# Patient Record
Sex: MALE | Race: WHITE | NOT HISPANIC OR LATINO | Employment: OTHER | URBAN - NONMETROPOLITAN AREA
[De-identification: names, ages, dates, MRNs, and addresses within clinical notes are randomized per-mention and may not be internally consistent; named-entity substitution may affect disease eponyms.]

---

## 2023-05-01 DIAGNOSIS — I10 PRIMARY HYPERTENSION: Primary | ICD-10-CM

## 2023-05-01 RX ORDER — LISINOPRIL 5 MG/1
1 TABLET ORAL DAILY
COMMUNITY
Start: 2014-07-28 | End: 2023-05-01 | Stop reason: SDUPTHER

## 2023-05-01 RX ORDER — LISINOPRIL 5 MG/1
5 TABLET ORAL DAILY
Qty: 90 TABLET | Refills: 0 | Status: SHIPPED | OUTPATIENT
Start: 2023-05-01 | End: 2023-05-08 | Stop reason: ALTCHOICE

## 2023-05-08 DIAGNOSIS — I10 PRIMARY HYPERTENSION: Primary | ICD-10-CM

## 2023-05-08 RX ORDER — LISINOPRIL 10 MG/1
10 TABLET ORAL DAILY
COMMUNITY
Start: 2023-02-28 | End: 2023-05-08 | Stop reason: SDUPTHER

## 2023-05-08 RX ORDER — LISINOPRIL 10 MG/1
10 TABLET ORAL DAILY
Qty: 90 TABLET | Refills: 1 | Status: SHIPPED | OUTPATIENT
Start: 2023-05-08 | End: 2023-06-13 | Stop reason: ALTCHOICE

## 2023-05-30 PROBLEM — N32.89 BLADDER MASS: Status: ACTIVE | Noted: 2023-05-30

## 2023-05-30 PROBLEM — N52.9 ERECTILE DYSFUNCTION: Status: ACTIVE | Noted: 2023-05-30

## 2023-05-30 PROBLEM — I10 BENIGN HYPERTENSION: Status: ACTIVE | Noted: 2023-05-30

## 2023-05-30 PROBLEM — E78.00 PURE HYPERCHOLESTEROLEMIA: Status: ACTIVE | Noted: 2023-05-30

## 2023-05-30 PROBLEM — G47.30 SLEEP APNEA: Status: ACTIVE | Noted: 2023-05-30

## 2023-05-30 PROBLEM — N40.0 BPH WITH ELEVATED PSA: Status: ACTIVE | Noted: 2023-05-30

## 2023-05-30 PROBLEM — K21.9 GERD (GASTROESOPHAGEAL REFLUX DISEASE): Status: ACTIVE | Noted: 2023-05-30

## 2023-05-30 PROBLEM — R97.20 BPH WITH ELEVATED PSA: Status: ACTIVE | Noted: 2023-05-30

## 2023-05-30 NOTE — PROGRESS NOTES
Subjective   Patient ID:   Van Latham is a 67 y.o. male who presents for No chief complaint on file..  HPI  HTN:  Taking Lisinopril - I increased this last visit.  BP today is  Denies dizziness, headaches.    Bladder mass/BPH:  Had an elevated PSA in Nov 2021.  Seeing urology.  He underwent bladder surgery in March 2022.  Had more procedures in Aug 2022.  Had prostate MRI in Jan 2022.    HLD:  Taking Crestor and Fenofibrate.  Last checked Dec 2022.    Left heel pain:  Off and on.  Not overly bothersome.    GERD:  Taking Prilosec every other day.  No acid reflux concerns.    ED:  Taking Viagra as needed.    MIREYA:  On Bipap.    Health maintenance:  Smoking: Never a smoker.  PSA (50+): Dec 2022  Labs: Dec 2022  Colonoscopy (50-75): Jan 2018.  Influenza:   Prevnar 13 (65+): 2013  Pneumovax 23 (65+, 1 year after Prev 13): 2022  Influenza:  Zostavax (60+, 1 time, at pharmacy): 2020, 2021     Review of Systems  12 point review of systems negative unless stated above in HPI    There were no vitals filed for this visit.    Physical Exam  General: Alert and oriented, well nourished, no acute distress.  Lungs: Clear to auscultation, non-labored respiration.  Heart: Normal rate, regular rhythm, no murmur, gallop or edema.  Neurologic: Awake, alert, and oriented X3, CN II-XII intact.  Psychiatric: Cooperative, appropriate mood and affect.    Assessment/Plan   Diagnoses and all orders for this visit:  Medicare annual wellness visit, subsequent  Depression screening  Benign hypertension  Bladder mass  BPH with elevated PSA  Erectile dysfunction, unspecified erectile dysfunction type  Gastroesophageal reflux disease without esophagitis  Pure hypercholesterolemia  Sleep apnea, unspecified type

## 2023-06-13 ENCOUNTER — OFFICE VISIT (OUTPATIENT)
Dept: PRIMARY CARE | Facility: CLINIC | Age: 68
End: 2023-06-13
Payer: MEDICARE

## 2023-06-13 VITALS
RESPIRATION RATE: 18 BRPM | DIASTOLIC BLOOD PRESSURE: 90 MMHG | SYSTOLIC BLOOD PRESSURE: 146 MMHG | BODY MASS INDEX: 32.64 KG/M2 | HEART RATE: 61 BPM | OXYGEN SATURATION: 94 % | WEIGHT: 228 LBS | HEIGHT: 70 IN

## 2023-06-13 DIAGNOSIS — N40.0 BPH WITH ELEVATED PSA: ICD-10-CM

## 2023-06-13 DIAGNOSIS — I10 BENIGN HYPERTENSION: Primary | ICD-10-CM

## 2023-06-13 DIAGNOSIS — N32.89 BLADDER MASS: ICD-10-CM

## 2023-06-13 DIAGNOSIS — E78.00 PURE HYPERCHOLESTEROLEMIA: ICD-10-CM

## 2023-06-13 DIAGNOSIS — R97.20 BPH WITH ELEVATED PSA: ICD-10-CM

## 2023-06-13 DIAGNOSIS — N52.9 ERECTILE DYSFUNCTION, UNSPECIFIED ERECTILE DYSFUNCTION TYPE: ICD-10-CM

## 2023-06-13 DIAGNOSIS — I10 PRIMARY HYPERTENSION: ICD-10-CM

## 2023-06-13 DIAGNOSIS — G47.30 SLEEP APNEA, UNSPECIFIED TYPE: ICD-10-CM

## 2023-06-13 DIAGNOSIS — K21.9 GASTROESOPHAGEAL REFLUX DISEASE WITHOUT ESOPHAGITIS: ICD-10-CM

## 2023-06-13 PROCEDURE — 3077F SYST BP >= 140 MM HG: CPT | Performed by: PHYSICIAN ASSISTANT

## 2023-06-13 PROCEDURE — 99213 OFFICE O/P EST LOW 20 MIN: CPT | Performed by: PHYSICIAN ASSISTANT

## 2023-06-13 PROCEDURE — 3080F DIAST BP >= 90 MM HG: CPT | Performed by: PHYSICIAN ASSISTANT

## 2023-06-13 PROCEDURE — 1036F TOBACCO NON-USER: CPT | Performed by: PHYSICIAN ASSISTANT

## 2023-06-13 PROCEDURE — 1159F MED LIST DOCD IN RCRD: CPT | Performed by: PHYSICIAN ASSISTANT

## 2023-06-13 PROCEDURE — 1160F RVW MEDS BY RX/DR IN RCRD: CPT | Performed by: PHYSICIAN ASSISTANT

## 2023-06-13 RX ORDER — LISINOPRIL 20 MG/1
20 TABLET ORAL DAILY
Qty: 90 TABLET | Refills: 1 | Status: SHIPPED | OUTPATIENT
Start: 2023-06-13 | End: 2023-11-15

## 2023-06-13 RX ORDER — SILDENAFIL 100 MG/1
100 TABLET, FILM COATED ORAL AS NEEDED
COMMUNITY
Start: 2018-02-05 | End: 2023-07-03 | Stop reason: SDUPTHER

## 2023-06-13 RX ORDER — OMEPRAZOLE 20 MG/1
1 CAPSULE, DELAYED RELEASE ORAL DAILY
COMMUNITY
Start: 2014-07-28 | End: 2023-06-29 | Stop reason: SDUPTHER

## 2023-06-13 RX ORDER — FENOFIBRATE 145 MG/1
145 TABLET, FILM COATED ORAL DAILY
COMMUNITY
End: 2023-07-03 | Stop reason: SDUPTHER

## 2023-06-13 RX ORDER — ROSUVASTATIN CALCIUM 10 MG/1
1 TABLET, COATED ORAL DAILY
COMMUNITY
Start: 2014-08-29 | End: 2023-06-29 | Stop reason: SDUPTHER

## 2023-06-13 ASSESSMENT — PATIENT HEALTH QUESTIONNAIRE - PHQ9
SUM OF ALL RESPONSES TO PHQ9 QUESTIONS 1 AND 2: 0
2. FEELING DOWN, DEPRESSED OR HOPELESS: NOT AT ALL
1. LITTLE INTEREST OR PLEASURE IN DOING THINGS: NOT AT ALL

## 2023-06-13 ASSESSMENT — PAIN SCALES - GENERAL: PAINLEVEL: 2

## 2023-06-13 NOTE — PROGRESS NOTES
Subjective   Patient ID:   Van Latham is a 67 y.o. male who presents for Follow-up (6 month f/u).  HPI  HTN:  Taking Lisinopril - I increased this last visit.  BP today is 146/90.  Runs in the 130s at home.  Denies dizziness, headaches.    Bladder mass/BPH:  Had an elevated PSA in Nov 2021.  Seeing urology.  He underwent bladder surgery in March 2022.  Had more procedures in Aug 2022.  Had prostate MRI in Jan 2022.    HLD:  Taking Crestor and Fenofibrate.  Last checked Dec 2022.    Left heel pain:  Off and on.  Not overly bothersome.    GERD:  Taking Prilosec every other day.  No acid reflux concerns.    ED:  Taking Viagra as needed.    MIREYA:  On Bipap.    Health maintenance:  Smoking: Never a smoker.  PSA (50+): Dec 2022  Labs: Dec 2022  Colonoscopy (50-75): Jan 2018.  Influenza:   Prevnar 13 (65+): 2013  Pneumovax 23 (65+, 1 year after Prev 13): 2022  Influenza:  Zostavax (60+, 1 time, at pharmacy): 2020, 2021     Review of Systems  12 point review of systems negative unless stated above in HPI    Vitals:    06/13/23 0742   BP: 146/90   Pulse: 61   Resp: 18   SpO2: 94%       Physical Exam  General: Alert and oriented, well nourished, no acute distress.  Lungs: Clear to auscultation, non-labored respiration.  Heart: Normal rate, regular rhythm, no murmur, gallop or edema.  Neurologic: Awake, alert, and oriented X3, CN II-XII intact.  Psychiatric: Cooperative, appropriate mood and affect.    Assessment/Plan   BP is still a bit up today.  I have increased the Lisinopril to 20mg.  Continue checking at home and call if getting over 140/90.  I have also ordered a cardiac CT to be done.  Continue the same medications.  Chronic conditions are stable.  Call with questions or concerns.    F/u  6 months  Diagnoses and all orders for this visit:  Benign hypertension  -     CT cardiac scoring wo IV contrast; Future  Bladder mass  BPH with elevated PSA  Erectile dysfunction, unspecified erectile dysfunction  type  Gastroesophageal reflux disease without esophagitis  Pure hypercholesterolemia  -     CT cardiac scoring wo IV contrast; Future  Sleep apnea, unspecified type  Primary hypertension  -     lisinopril 20 mg tablet; Take 1 tablet (20 mg) by mouth once daily.

## 2023-06-28 NOTE — RESULT ENCOUNTER NOTE
Calcium score shows a 1.3% annual cardiac risk which is in the middle range. If we get his BP under control, I think he will be in good shape. Diet and exercise

## 2023-06-29 DIAGNOSIS — E78.00 PURE HYPERCHOLESTEROLEMIA: ICD-10-CM

## 2023-06-29 DIAGNOSIS — K21.9 GASTROESOPHAGEAL REFLUX DISEASE WITHOUT ESOPHAGITIS: ICD-10-CM

## 2023-06-29 RX ORDER — OMEPRAZOLE 20 MG/1
20 CAPSULE, DELAYED RELEASE ORAL DAILY
Qty: 90 CAPSULE | Refills: 0 | Status: SHIPPED | OUTPATIENT
Start: 2023-06-29 | End: 2023-09-18

## 2023-06-29 RX ORDER — ROSUVASTATIN CALCIUM 10 MG/1
10 TABLET, COATED ORAL DAILY
Qty: 90 TABLET | Refills: 0 | Status: SHIPPED | OUTPATIENT
Start: 2023-06-29 | End: 2023-09-18

## 2023-07-03 DIAGNOSIS — E78.00 PURE HYPERCHOLESTEROLEMIA: ICD-10-CM

## 2023-07-03 DIAGNOSIS — N52.9 ERECTILE DYSFUNCTION, UNSPECIFIED ERECTILE DYSFUNCTION TYPE: ICD-10-CM

## 2023-07-03 RX ORDER — FENOFIBRATE 145 MG/1
145 TABLET, FILM COATED ORAL DAILY
Qty: 90 TABLET | Refills: 0 | Status: SHIPPED | OUTPATIENT
Start: 2023-07-03 | End: 2023-10-02

## 2023-07-03 RX ORDER — SILDENAFIL 100 MG/1
100 TABLET, FILM COATED ORAL AS NEEDED
Qty: 10 TABLET | Refills: 2 | Status: SHIPPED | OUTPATIENT
Start: 2023-07-03 | End: 2024-04-12 | Stop reason: ALTCHOICE

## 2023-09-16 DIAGNOSIS — E78.00 PURE HYPERCHOLESTEROLEMIA: ICD-10-CM

## 2023-09-16 DIAGNOSIS — K21.9 GASTROESOPHAGEAL REFLUX DISEASE WITHOUT ESOPHAGITIS: ICD-10-CM

## 2023-09-18 RX ORDER — OMEPRAZOLE 20 MG/1
20 CAPSULE, DELAYED RELEASE ORAL DAILY
Qty: 90 CAPSULE | Refills: 0 | Status: SHIPPED | OUTPATIENT
Start: 2023-09-18 | End: 2023-12-15

## 2023-09-18 RX ORDER — ROSUVASTATIN CALCIUM 10 MG/1
10 TABLET, COATED ORAL DAILY
Qty: 90 TABLET | Refills: 0 | Status: SHIPPED | OUTPATIENT
Start: 2023-09-18 | End: 2023-12-15

## 2023-10-01 DIAGNOSIS — E78.00 PURE HYPERCHOLESTEROLEMIA: ICD-10-CM

## 2023-10-02 RX ORDER — FENOFIBRATE 145 MG/1
145 TABLET, FILM COATED ORAL DAILY
Qty: 90 TABLET | Refills: 0 | Status: SHIPPED | OUTPATIENT
Start: 2023-10-02 | End: 2024-01-08

## 2023-12-11 ENCOUNTER — APPOINTMENT (OUTPATIENT)
Dept: PRIMARY CARE | Facility: CLINIC | Age: 68
End: 2023-12-11
Payer: MEDICARE

## 2023-12-12 ENCOUNTER — OFFICE VISIT (OUTPATIENT)
Dept: PRIMARY CARE | Facility: CLINIC | Age: 68
End: 2023-12-12
Payer: MEDICARE

## 2023-12-12 VITALS
BODY MASS INDEX: 32.47 KG/M2 | OXYGEN SATURATION: 96 % | DIASTOLIC BLOOD PRESSURE: 82 MMHG | SYSTOLIC BLOOD PRESSURE: 132 MMHG | WEIGHT: 226.8 LBS | HEIGHT: 70 IN | HEART RATE: 63 BPM

## 2023-12-12 DIAGNOSIS — R79.9 ABNORMAL FINDING OF BLOOD CHEMISTRY, UNSPECIFIED: ICD-10-CM

## 2023-12-12 DIAGNOSIS — Z00.00 ENCOUNTER FOR ANNUAL WELLNESS EXAM IN MEDICARE PATIENT: ICD-10-CM

## 2023-12-12 DIAGNOSIS — R01.1 MURMUR, CARDIAC: ICD-10-CM

## 2023-12-12 DIAGNOSIS — E78.00 PURE HYPERCHOLESTEROLEMIA: ICD-10-CM

## 2023-12-12 DIAGNOSIS — I10 BENIGN HYPERTENSION: ICD-10-CM

## 2023-12-12 DIAGNOSIS — R97.20 ELEVATED PSA: ICD-10-CM

## 2023-12-12 PROBLEM — M79.672 PAIN OF LEFT HEEL: Status: RESOLVED | Noted: 2023-12-12 | Resolved: 2023-12-12

## 2023-12-12 PROBLEM — N39.0 ACUTE LOWER UTI: Status: RESOLVED | Noted: 2023-12-12 | Resolved: 2023-12-12

## 2023-12-12 PROBLEM — R53.83 FATIGUE: Status: RESOLVED | Noted: 2023-12-12 | Resolved: 2023-12-12

## 2023-12-12 PROBLEM — J20.8 ACUTE BRONCHITIS DUE TO OTHER SPECIFIED ORGANISMS: Status: RESOLVED | Noted: 2023-12-12 | Resolved: 2023-12-12

## 2023-12-12 PROBLEM — J04.0 ACUTE LARYNGITIS: Status: RESOLVED | Noted: 2023-12-12 | Resolved: 2023-12-12

## 2023-12-12 PROBLEM — E83.52 HIGH CALCIUM LEVELS: Status: ACTIVE | Noted: 2023-12-12

## 2023-12-12 PROBLEM — R06.83 SNORING: Status: RESOLVED | Noted: 2023-12-12 | Resolved: 2023-12-12

## 2023-12-12 PROBLEM — J20.9 ACUTE BRONCHITIS: Status: RESOLVED | Noted: 2023-12-12 | Resolved: 2023-12-12

## 2023-12-12 PROBLEM — J01.00 ACUTE MAXILLARY SINUSITIS: Status: RESOLVED | Noted: 2023-12-12 | Resolved: 2023-12-12

## 2023-12-12 PROBLEM — S83.241A TEAR OF MEDIAL MENISCUS OF RIGHT KNEE, CURRENT: Status: ACTIVE | Noted: 2023-12-12

## 2023-12-12 PROBLEM — E66.811 OBESITY, CLASS I, BMI 30-34.9: Status: ACTIVE | Noted: 2023-12-12

## 2023-12-12 PROBLEM — R40.0 DAYTIME SOMNOLENCE: Status: RESOLVED | Noted: 2023-12-12 | Resolved: 2023-12-12

## 2023-12-12 PROBLEM — R73.9 HYPERGLYCEMIA: Status: ACTIVE | Noted: 2023-12-12

## 2023-12-12 PROBLEM — J02.9 ACUTE PHARYNGITIS: Status: RESOLVED | Noted: 2023-12-12 | Resolved: 2023-12-12

## 2023-12-12 PROBLEM — R07.89 CHEST PRESSURE: Status: RESOLVED | Noted: 2023-12-12 | Resolved: 2023-12-12

## 2023-12-12 PROBLEM — E66.9 OBESITY, CLASS I, BMI 30-34.9: Status: ACTIVE | Noted: 2023-12-12

## 2023-12-12 PROBLEM — R19.5 OCCULT BLOOD IN STOOLS: Status: RESOLVED | Noted: 2023-12-12 | Resolved: 2023-12-12

## 2023-12-12 PROBLEM — M25.512 LEFT SHOULDER PAIN: Status: RESOLVED | Noted: 2023-12-12 | Resolved: 2023-12-12

## 2023-12-12 PROCEDURE — G0439 PPPS, SUBSEQ VISIT: HCPCS | Performed by: INTERNAL MEDICINE

## 2023-12-12 PROCEDURE — 3075F SYST BP GE 130 - 139MM HG: CPT | Performed by: INTERNAL MEDICINE

## 2023-12-12 PROCEDURE — 3079F DIAST BP 80-89 MM HG: CPT | Performed by: INTERNAL MEDICINE

## 2023-12-12 PROCEDURE — 1160F RVW MEDS BY RX/DR IN RCRD: CPT | Performed by: INTERNAL MEDICINE

## 2023-12-12 PROCEDURE — 1125F AMNT PAIN NOTED PAIN PRSNT: CPT | Performed by: INTERNAL MEDICINE

## 2023-12-12 PROCEDURE — 3008F BODY MASS INDEX DOCD: CPT | Performed by: INTERNAL MEDICINE

## 2023-12-12 PROCEDURE — 1159F MED LIST DOCD IN RCRD: CPT | Performed by: INTERNAL MEDICINE

## 2023-12-12 PROCEDURE — 1036F TOBACCO NON-USER: CPT | Performed by: INTERNAL MEDICINE

## 2023-12-12 PROCEDURE — 99214 OFFICE O/P EST MOD 30 MIN: CPT | Performed by: INTERNAL MEDICINE

## 2023-12-12 PROCEDURE — 1170F FXNL STATUS ASSESSED: CPT | Performed by: INTERNAL MEDICINE

## 2023-12-12 RX ORDER — TADALAFIL 5 MG/1
5 TABLET ORAL DAILY
COMMUNITY
End: 2024-01-24 | Stop reason: SDUPTHER

## 2023-12-12 RX ORDER — TADALAFIL 10 MG/1
TABLET ORAL
COMMUNITY
Start: 2023-10-09 | End: 2024-01-24 | Stop reason: SDUPTHER

## 2023-12-12 ASSESSMENT — ACTIVITIES OF DAILY LIVING (ADL)
DOING_HOUSEWORK: INDEPENDENT
DRESSING: INDEPENDENT
MANAGING_FINANCES: INDEPENDENT
GROCERY_SHOPPING: INDEPENDENT
TAKING_MEDICATION: INDEPENDENT
BATHING: INDEPENDENT

## 2023-12-12 ASSESSMENT — PATIENT HEALTH QUESTIONNAIRE - PHQ9
1. LITTLE INTEREST OR PLEASURE IN DOING THINGS: NOT AT ALL
SUM OF ALL RESPONSES TO PHQ9 QUESTIONS 1 AND 2: 0
2. FEELING DOWN, DEPRESSED OR HOPELESS: NOT AT ALL

## 2023-12-12 NOTE — PROGRESS NOTES
Patient ID:   Van Latham is a 68 y.o. male with PMH remarkable for HTN, BPH s/p robotic simple prostatectomy (benign pathology), GERD, ED, MIREYA on bipap, who presents to the office today for Annual Exam and Medicare Annual Wellness Visit Subsequent.    HEALTH MAINTENANCE: Annual Wellness Physical   Smoking: Never a Smoker  Labs: 2022 -> DUE  PSA: DUE  Colonoscopy (45-75): 1/15/2018 with Dr Gee, normal  CT Cardiac Scoring 2023 showed calcium score of 180.9  - lost weight since last year. 240# 2022 --> today 226#  - brother recently diagnosed with valve issue, other brother dx with high CT calcium scoring and is undergoing angiogram  - father  at age 56 of cardiac arrest  - FU in 4m or sooner if needed    SOCIAL HISTORY:  Social History     Tobacco Use    Smoking status: Never    Smokeless tobacco: Never   Substance Use Topics    Alcohol use: Not Currently     Comment: rare    Drug use: Never     IMMUNIZATIONS:  Immunization History   Administered Date(s) Administered    Moderna SARS-CoV-2 Vaccination 2021, 2021, 2021, 2022     REVIEW OF SYSTEMS:  Review of Systems   All other systems reviewed and are negative.    ALLERGIES:  No Known Allergies     VITAL SIGNS:  Vitals:    23 1116   BP: 132/82   Pulse: 63   SpO2: 96%       Physical Exam  Vitals reviewed.   Constitutional:       General: He is not in acute distress.     Appearance: Normal appearance. He is not ill-appearing.   HENT:      Head: Normocephalic and atraumatic.      Right Ear: Tympanic membrane and external ear normal.      Left Ear: Tympanic membrane and external ear normal.      Nose: Nose normal.      Mouth/Throat:      Mouth: Mucous membranes are moist.      Pharynx: Oropharynx is clear.   Eyes:      Conjunctiva/sclera: Conjunctivae normal.      Pupils: Pupils are equal, round, and reactive to light.   Cardiovascular:      Rate and Rhythm: Normal rate and regular rhythm.      Heart sounds: Murmur heard.    Pulmonary:      Effort: Pulmonary effort is normal. No respiratory distress.      Breath sounds: Normal breath sounds. No wheezing.   Abdominal:      General: There is no distension.      Palpations: Abdomen is soft. There is no mass.      Tenderness: There is no abdominal tenderness.   Musculoskeletal:         General: Normal range of motion.      Cervical back: Normal range of motion and neck supple.   Skin:     General: Skin is warm and dry.   Neurological:      General: No focal deficit present.      Mental Status: He is alert and oriented to person, place, and time.      Sensory: No sensory deficit.      Motor: No weakness.      Coordination: Coordination normal.      Gait: Gait normal.   Psychiatric:         Mood and Affect: Mood normal.         Behavior: Behavior normal.       MEDICATIONS:  Current Outpatient Medications on File Prior to Visit   Medication Sig Dispense Refill    fenofibrate (Tricor) 145 mg tablet take 1 tablet by mouth once daily 90 tablet 0    lisinopril 20 mg tablet TAKE 1 TABLET ONCE DAILY 90 tablet 1    omeprazole (PriLOSEC) 20 mg DR capsule TAKE 1 CAPSULE DAILY 90 capsule 0    rosuvastatin (Crestor) 10 mg tablet TAKE 1 TABLET ONCE DAILY 90 tablet 0    sildenafil (Viagra) 100 mg tablet Take 1 tablet (100 mg) by mouth if needed for erectile dysfunction. 10 tablet 2    tadalafil (Cialis) 10 mg tablet take 1 to 2 tablets by mouth 1 to 2 hours PRIOR TO SEXUAL ACTIVITY      tadalafil (Cialis) 5 mg tablet Take 1 tablet (5 mg) by mouth once daily.       No current facility-administered medications on file prior to visit.      LABORATORY DATA:  Lab Results   Component Value Date    WBC 6.9 12/13/2022    HGB 12.0 (L) 12/13/2022    HCT 40.2 (L) 12/13/2022     12/13/2022    CHOL 135 12/13/2022    TRIG 123 12/13/2022    HDL 30.0 (A) 12/13/2022    ALT 28 12/13/2022    AST 34 12/13/2022     12/13/2022    K 4.1 12/13/2022     12/13/2022    CREATININE 1.20 12/13/2022    BUN 22  12/13/2022    CO2 26 12/13/2022    TSH 2.38 11/23/2019    PSA 5.6 (H) 12/20/2021    INR 1.1 03/07/2022    HGBA1C 5.8 (A) 11/23/2021     ASSESSMENT AND PLAN:  Assessment/Plan   Diagnoses and all orders for this visit:  Encounter for annual wellness exam in Medicare patient  -     CBC and Auto Differential; Future  -     Comprehensive Metabolic Panel; Future  -     Lipid Panel; Future  -     TSH with reflex to Free T4 if abnormal; Future  -     Vitamin D 25-Hydroxy,Total (for eval of Vitamin D levels); Future  -     Vitamin B12; Future  Elevated PSA  -     Prostate Specific Antigen, Screen; Future  Benign hypertension  -     TSH with reflex to Free T4 if abnormal; Future  -     Vitamin D 25-Hydroxy,Total (for eval of Vitamin D levels); Future  -     Vitamin B12; Future  Pure hypercholesterolemia  -     Lipid Panel; Future  Murmur, cardiac  -     Transthoracic Echo (TTE) Complete; Future  Abnormal finding of blood chemistry, unspecified  -     CBC and Auto Differential; Future  Body mass index (BMI) 32.0-32.9, adult  -     Vitamin D 25-Hydroxy,Total (for eval of Vitamin D levels); Future      --------------------  Written by Rosa Morataya RN, acting as a scribe for Dr. Mckinnon. This note accurately reflects the work and decisions made by Dr. Mckinnon.     I, Dr. Mckinnon, attest all medical record entries made by the scribe were under my direction and were personally dictated by me. I have reviewed the chart and agree that the record accurately reflects my performance of the history, physical exam, and assessment and plan.

## 2023-12-12 NOTE — PATIENT INSTRUCTIONS
It was nice to see you today!    Please have your blood drawn in the next 1-2 weeks.   You need to be FASTING for 12 hours prior to blood draw.  We will contact you with the results of your blood work and any necessary adjustments to your plan of care.  If you do not hear from us within 3-5 days of having your blood drawn, please call the [place] office at [phone].   Please contact the office if there are any questions regarding your care or treatment.     [facility] [phone].

## 2023-12-13 ENCOUNTER — LAB (OUTPATIENT)
Dept: LAB | Facility: LAB | Age: 68
End: 2023-12-13
Payer: MEDICARE

## 2023-12-13 DIAGNOSIS — I10 BENIGN HYPERTENSION: ICD-10-CM

## 2023-12-13 DIAGNOSIS — R79.9 ABNORMAL FINDING OF BLOOD CHEMISTRY, UNSPECIFIED: ICD-10-CM

## 2023-12-13 DIAGNOSIS — E78.00 PURE HYPERCHOLESTEROLEMIA: ICD-10-CM

## 2023-12-13 DIAGNOSIS — R97.20 ELEVATED PSA: ICD-10-CM

## 2023-12-13 DIAGNOSIS — Z00.00 ENCOUNTER FOR ANNUAL WELLNESS EXAM IN MEDICARE PATIENT: ICD-10-CM

## 2023-12-13 LAB
25(OH)D3 SERPL-MCNC: 17 NG/ML (ref 30–100)
ALBUMIN SERPL BCP-MCNC: 4.3 G/DL (ref 3.4–5)
ALP SERPL-CCNC: 36 U/L (ref 33–136)
ALT SERPL W P-5'-P-CCNC: 22 U/L (ref 10–52)
ANION GAP SERPL CALC-SCNC: 11 MMOL/L (ref 10–20)
AST SERPL W P-5'-P-CCNC: 26 U/L (ref 9–39)
BASOPHILS # BLD AUTO: 0.08 X10*3/UL (ref 0–0.1)
BASOPHILS NFR BLD AUTO: 1.3 %
BILIRUB SERPL-MCNC: 0.4 MG/DL (ref 0–1.2)
BUN SERPL-MCNC: 20 MG/DL (ref 6–23)
CALCIUM SERPL-MCNC: 9.9 MG/DL (ref 8.6–10.3)
CHLORIDE SERPL-SCNC: 107 MMOL/L (ref 98–107)
CHOLEST SERPL-MCNC: 133 MG/DL (ref 0–199)
CHOLESTEROL/HDL RATIO: 4.8
CO2 SERPL-SCNC: 28 MMOL/L (ref 21–32)
CREAT SERPL-MCNC: 1.12 MG/DL (ref 0.5–1.3)
EOSINOPHIL # BLD AUTO: 0.26 X10*3/UL (ref 0–0.7)
EOSINOPHIL NFR BLD AUTO: 4.2 %
ERYTHROCYTE [DISTWIDTH] IN BLOOD BY AUTOMATED COUNT: 14.7 % (ref 11.5–14.5)
GFR SERPL CREATININE-BSD FRML MDRD: 72 ML/MIN/1.73M*2
GLUCOSE SERPL-MCNC: 99 MG/DL (ref 74–99)
HCT VFR BLD AUTO: 43.2 % (ref 41–52)
HDLC SERPL-MCNC: 28 MG/DL
HGB BLD-MCNC: 13.7 G/DL (ref 13.5–17.5)
IMM GRANULOCYTES # BLD AUTO: 0.02 X10*3/UL (ref 0–0.7)
IMM GRANULOCYTES NFR BLD AUTO: 0.3 % (ref 0–0.9)
LDLC SERPL CALC-MCNC: 76 MG/DL
LYMPHOCYTES # BLD AUTO: 2.08 X10*3/UL (ref 1.2–4.8)
LYMPHOCYTES NFR BLD AUTO: 33.3 %
MCH RBC QN AUTO: 29.3 PG (ref 26–34)
MCHC RBC AUTO-ENTMCNC: 31.7 G/DL (ref 32–36)
MCV RBC AUTO: 92 FL (ref 80–100)
MONOCYTES # BLD AUTO: 0.63 X10*3/UL (ref 0.1–1)
MONOCYTES NFR BLD AUTO: 10.1 %
NEUTROPHILS # BLD AUTO: 3.17 X10*3/UL (ref 1.2–7.7)
NEUTROPHILS NFR BLD AUTO: 50.8 %
NON HDL CHOLESTEROL: 105 MG/DL (ref 0–149)
NRBC BLD-RTO: 0 /100 WBCS (ref 0–0)
PLATELET # BLD AUTO: 226 X10*3/UL (ref 150–450)
POTASSIUM SERPL-SCNC: 4.3 MMOL/L (ref 3.5–5.3)
PROT SERPL-MCNC: 7 G/DL (ref 6.4–8.2)
PSA SERPL-MCNC: 0.62 NG/ML
RBC # BLD AUTO: 4.68 X10*6/UL (ref 4.5–5.9)
SODIUM SERPL-SCNC: 142 MMOL/L (ref 136–145)
TRIGL SERPL-MCNC: 143 MG/DL (ref 0–149)
TSH SERPL-ACNC: 2.09 MIU/L (ref 0.44–3.98)
VIT B12 SERPL-MCNC: 650 PG/ML (ref 211–911)
VLDL: 29 MG/DL (ref 0–40)
WBC # BLD AUTO: 6.2 X10*3/UL (ref 4.4–11.3)

## 2023-12-13 PROCEDURE — 82607 VITAMIN B-12: CPT

## 2023-12-13 PROCEDURE — 82306 VITAMIN D 25 HYDROXY: CPT

## 2023-12-13 PROCEDURE — 36415 COLL VENOUS BLD VENIPUNCTURE: CPT

## 2023-12-13 PROCEDURE — 84153 ASSAY OF PSA TOTAL: CPT

## 2023-12-15 ENCOUNTER — HOSPITAL ENCOUNTER (OUTPATIENT)
Dept: CARDIOLOGY | Facility: HOSPITAL | Age: 68
Discharge: HOME | End: 2023-12-15
Payer: MEDICARE

## 2023-12-15 DIAGNOSIS — E55.9 VITAMIN D DEFICIENCY: ICD-10-CM

## 2023-12-15 DIAGNOSIS — R01.1 MURMUR, CARDIAC: ICD-10-CM

## 2023-12-15 DIAGNOSIS — E78.00 PURE HYPERCHOLESTEROLEMIA: ICD-10-CM

## 2023-12-15 DIAGNOSIS — K21.9 GASTROESOPHAGEAL REFLUX DISEASE WITHOUT ESOPHAGITIS: ICD-10-CM

## 2023-12-15 LAB
AORTIC VALVE PEAK VELOCITY: 1.76
AV PEAK GRADIENT: 12.4
AVA (PEAK VEL): 2.07
EJECTION FRACTION APICAL 4 CHAMBER: 64.5
EJECTION FRACTION: 64
LEFT ATRIUM VOLUME AREA LENGTH INDEX BSA: 31.5
LEFT VENTRICLE INTERNAL DIMENSION DIASTOLE: 5.31 (ref 3.5–6)
LEFT VENTRICULAR OUTFLOW TRACT DIAMETER: 2
MITRAL VALVE E/A RATIO: 2.13
MITRAL VALVE E/E' RATIO: 8.73
RIGHT VENTRICLE FREE WALL PEAK S': 17.4
TRICUSPID ANNULAR PLANE SYSTOLIC EXCURSION: 2.7

## 2023-12-15 PROCEDURE — 93306 TTE W/DOPPLER COMPLETE: CPT

## 2023-12-15 PROCEDURE — 93306 TTE W/DOPPLER COMPLETE: CPT | Performed by: INTERNAL MEDICINE

## 2023-12-15 RX ORDER — OMEPRAZOLE 20 MG/1
20 CAPSULE, DELAYED RELEASE ORAL DAILY
Qty: 90 CAPSULE | Refills: 0 | Status: SHIPPED | OUTPATIENT
Start: 2023-12-15 | End: 2024-03-07

## 2023-12-15 RX ORDER — ERGOCALCIFEROL 1.25 MG/1
50000 CAPSULE ORAL
Qty: 16 CAPSULE | Refills: 0 | Status: SHIPPED | OUTPATIENT
Start: 2023-12-15 | End: 2023-12-20 | Stop reason: SDUPTHER

## 2023-12-15 RX ORDER — ROSUVASTATIN CALCIUM 10 MG/1
10 TABLET, COATED ORAL DAILY
Qty: 90 TABLET | Refills: 0 | Status: SHIPPED | OUTPATIENT
Start: 2023-12-15 | End: 2024-03-07

## 2023-12-20 DIAGNOSIS — E55.9 VITAMIN D DEFICIENCY: ICD-10-CM

## 2023-12-20 RX ORDER — ERGOCALCIFEROL 1.25 MG/1
50000 CAPSULE ORAL
Qty: 16 CAPSULE | Refills: 0 | Status: SHIPPED | OUTPATIENT
Start: 2023-12-20 | End: 2024-04-12

## 2024-01-06 DIAGNOSIS — E78.00 PURE HYPERCHOLESTEROLEMIA: ICD-10-CM

## 2024-01-08 RX ORDER — FENOFIBRATE 145 MG/1
145 TABLET, FILM COATED ORAL DAILY
Qty: 90 TABLET | Refills: 0 | Status: SHIPPED | OUTPATIENT
Start: 2024-01-08 | End: 2024-04-12 | Stop reason: SDUPTHER

## 2024-01-24 ENCOUNTER — OFFICE VISIT (OUTPATIENT)
Dept: UROLOGY | Facility: CLINIC | Age: 69
End: 2024-01-24
Payer: MEDICARE

## 2024-01-24 DIAGNOSIS — N40.0 BPH WITH ELEVATED PSA: ICD-10-CM

## 2024-01-24 DIAGNOSIS — N52.9 ERECTILE DYSFUNCTION, UNSPECIFIED ERECTILE DYSFUNCTION TYPE: ICD-10-CM

## 2024-01-24 DIAGNOSIS — R97.20 BPH WITH ELEVATED PSA: ICD-10-CM

## 2024-01-24 LAB
APPEARANCE UR: CLEAR
BILIRUB UR STRIP.AUTO-MCNC: NEGATIVE MG/DL
COLOR UR: YELLOW
GLUCOSE UR STRIP.AUTO-MCNC: NEGATIVE MG/DL
KETONES UR STRIP.AUTO-MCNC: NEGATIVE MG/DL
LEUKOCYTE ESTERASE UR QL STRIP.AUTO: NEGATIVE
NITRITE UR QL STRIP.AUTO: NEGATIVE
PH UR STRIP.AUTO: 6 [PH]
PROT UR STRIP.AUTO-MCNC: NEGATIVE MG/DL
RBC # UR STRIP.AUTO: NEGATIVE /UL
SP GR UR STRIP.AUTO: 1.02
UROBILINOGEN UR STRIP.AUTO-MCNC: <2 MG/DL

## 2024-01-24 PROCEDURE — 1125F AMNT PAIN NOTED PAIN PRSNT: CPT | Performed by: STUDENT IN AN ORGANIZED HEALTH CARE EDUCATION/TRAINING PROGRAM

## 2024-01-24 PROCEDURE — 3008F BODY MASS INDEX DOCD: CPT | Performed by: STUDENT IN AN ORGANIZED HEALTH CARE EDUCATION/TRAINING PROGRAM

## 2024-01-24 PROCEDURE — 1160F RVW MEDS BY RX/DR IN RCRD: CPT | Performed by: STUDENT IN AN ORGANIZED HEALTH CARE EDUCATION/TRAINING PROGRAM

## 2024-01-24 PROCEDURE — 81003 URINALYSIS AUTO W/O SCOPE: CPT

## 2024-01-24 PROCEDURE — 1036F TOBACCO NON-USER: CPT | Performed by: STUDENT IN AN ORGANIZED HEALTH CARE EDUCATION/TRAINING PROGRAM

## 2024-01-24 PROCEDURE — 87086 URINE CULTURE/COLONY COUNT: CPT

## 2024-01-24 PROCEDURE — 99213 OFFICE O/P EST LOW 20 MIN: CPT | Performed by: STUDENT IN AN ORGANIZED HEALTH CARE EDUCATION/TRAINING PROGRAM

## 2024-01-24 RX ORDER — TADALAFIL 10 MG/1
10 TABLET ORAL DAILY PRN
Qty: 10 TABLET | Refills: 11 | Status: SHIPPED | OUTPATIENT
Start: 2024-01-24 | End: 2025-01-23

## 2024-01-24 RX ORDER — TADALAFIL 5 MG/1
5 TABLET ORAL DAILY
Qty: 30 TABLET | Refills: 11 | Status: SHIPPED | OUTPATIENT
Start: 2024-01-24 | End: 2025-01-23

## 2024-01-24 NOTE — PROGRESS NOTES
Subjective   Patient ID: Van Latham is a 68 y.o. male.    HPI  67 yo male F/Us/p Robotic Simple Prostatectomy 8/11/22, benign pathology. Most recent PSA  0.62.     He is doing well overall. No urinary symptoms. Denies nocturia.     He is on tadalafil 5 mg daily with 10 mg on demand dose, 1-2 tablets PRN.     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 Has not had repeat cysto. Denies any hematuria at this point.     08/11/22 Surgical pathology (s/p Simple Prostatectomy)  Benign prostatic hyperplasia, glandular and stromal types, with chronic inflammation.    Component  Ref Range & Units 1 mo ago 1 yr ago 2 yr ago 5 yr ago   Prostate Specific Antigen,Screen  <=4.00 ng/mL 0.62 1.17 R, CM 5.85 High  R, CM 2.51 R, CM         Review of Systems   All other systems reviewed and are negative.      Objective   Physical Exam  Vitals reviewed.         Assessment/Plan   67 yo male F/Us/p Robotic Simple Prostatectomy 8/11/22, benign pathology. Most recent PSA  0.62.     He is doing well overall. No urinary symptoms. Denies nocturia.     He is on tadalafil 5 mg daily with 10 mg on demand dose, 1-2 tablets PRN.     For atypical proliferation in the bladder we will perform a cystoscopy. He agrees with plan.     Plan:   Ua, U culture.   Continue tadalafil 5 mg daily + 10 mg on demand.   Cystoscopy.   Diagnoses and all orders for this visit:  BPH with elevated PSA  -     Urine Culture  -     Urinalysis with Reflex Microscopic  -     tadalafil (Cialis) 5 mg tablet; Take 1 tablet (5 mg) by mouth once daily.  Erectile dysfunction, unspecified erectile dysfunction type  -     tadalafil (Cialis) 10 mg tablet; Take 1 tablet (10 mg) by mouth once daily as needed for erectile dysfunction.    Scribe Attestation  By signing my name below, I, Anusha Sanchez attest that this documentation has been prepared under the direction and in the presence of Jonah Whyte MD.

## 2024-01-25 LAB — BACTERIA UR CULT: NO GROWTH

## 2024-02-23 ENCOUNTER — PROCEDURE VISIT (OUTPATIENT)
Dept: UROLOGY | Facility: CLINIC | Age: 69
End: 2024-02-23
Payer: MEDICARE

## 2024-02-23 DIAGNOSIS — Z79.2 PROPHYLACTIC ANTIBIOTIC: ICD-10-CM

## 2024-02-23 DIAGNOSIS — D49.4 BLADDER TUMOR: Primary | ICD-10-CM

## 2024-02-23 LAB
POC APPEARANCE, URINE: CLEAR
POC BILIRUBIN, URINE: NEGATIVE
POC BLOOD, URINE: NEGATIVE
POC COLOR, URINE: YELLOW
POC GLUCOSE, URINE: NEGATIVE MG/DL
POC KETONES, URINE: NEGATIVE MG/DL
POC LEUKOCYTES, URINE: NEGATIVE
POC NITRITE,URINE: NEGATIVE
POC PH, URINE: 5.5 PH
POC PROTEIN, URINE: NEGATIVE MG/DL
POC SPECIFIC GRAVITY, URINE: >=1.03
POC UROBILINOGEN, URINE: 0.2 EU/DL

## 2024-02-23 PROCEDURE — 99213 OFFICE O/P EST LOW 20 MIN: CPT | Performed by: STUDENT IN AN ORGANIZED HEALTH CARE EDUCATION/TRAINING PROGRAM

## 2024-02-23 PROCEDURE — 52000 CYSTOURETHROSCOPY: CPT | Performed by: STUDENT IN AN ORGANIZED HEALTH CARE EDUCATION/TRAINING PROGRAM

## 2024-02-23 RX ORDER — LEVOFLOXACIN 500 MG/1
500 TABLET, FILM COATED ORAL ONCE
Status: COMPLETED | OUTPATIENT
Start: 2024-02-23 | End: 2024-02-23

## 2024-02-23 RX ADMIN — LEVOFLOXACIN 500 MG: 500 TABLET, FILM COATED ORAL at 09:40

## 2024-02-23 NOTE — PROGRESS NOTES
Patient ID: Van Latham is a 68 y.o. male.    Procedures  PROCEDURE NOTE:    PREOPERATIVE DIAGNOSIS:  Bladder tumor    POSTOPERATIVE DIAGNOSIS:  No recurrence    OPERATION:  Flexible Cystourethroscopy      SURGEON:  Jonah Whyte MD    ANESTHESIA:  2%  lidocaine jelly    COMPLICATIONS:  None    EBL: Minimal    SPECIMEN:  Voided urine was not collected and submitted for cytology.    DISPOSITION:  The patient was discharged home after the procedure, per routine.    INDICATIONS: :  Mr. Latham is a 68 y.o. 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     The indications, risks and benefits of this procedure were discussed with the patient, consent was obtained prior to the procedure, and to the best of my judgement the patient seemed to understand and agree to the procedure.    PROCEDURE:  The patient  was brought into the procedure suite and informed consent was reviewed and confirmed. Vital signs were obtained prior to the procedure: There were no vitals taken for this visit..  The patient was escorted onto the stretcher, placed supine, prepped with betadine and draped in the usual standard surgical fashion.  Intraurethral 2% viscous lidocaine jelly was used for local analgesia.  A 16 Vincentian flexible cystourethroscope was inserted into the urethra.   The penile urethra was normal.  The prostate urethra was wide open with fossa of RASP.  Upon entering the bladder the entire bladder was surveyed in a 360 degree fashion.  The left and right ureteral orifices were in normal orthotopic position effluxing clear yellow urine, bilaterally.   There was no evidence of any bladder lesions, foreign objects, stones or evidence of any mucosal changes. The cystoscope was then retroflexed.  The bladder neck was then further examined without any evidence of lesions. The scope was then removed and in an antegrade fashion, the urethra and bladder were again resurveyed with no  evidence of additional lesions.  The cystoscope was then fully removed.   The patient tolerated the procedure well.  Vitals were stable after the procedure.  The patient was able to void and was discharged home.  Verbal and written Post procedure instructions were reviewed with the patient.    IMPRESSION:  No recurrence     PLAN:  Cystoscopy in 1 year    Subjective   Patient ID: Van Latham is a 68 y.o. male.    HPI  69 yo male F/Us/p Robotic Simple Prostatectomy 8/11/22, benign pathology. Most recent PSA  0.62.     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 Has not had repeat cysto. Denies any hematuria at this point. He presents for cystoscopy today.      He is doing well overall. No urinary symptoms. Denies nocturia.      He is on tadalafil 5 mg daily with 10 mg on demand dose, 1-2 tablets PRN.      08/11/22 Surgical pathology (s/p Simple Prostatectomy)  Benign prostatic hyperplasia, glandular and stromal types, with chronic inflammation.     Lab Results   Component Value Date    PSASCREEN 0.62 12/13/2023    PSASCREEN 1.17 12/13/2022    PSASCREEN 5.85 (H) 11/20/2021    PSASCREEN 2.51 12/08/2018       Review of Systems    Objective   Physical Exam    Assessment/Plan   69 yo male F/Us/p Robotic Simple Prostatectomy 8/11/22, benign pathology. Most recent PSA  0.62.     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 Has not had repeat cysto.    He presents for cystoscopy today. Findings as above. Reassured patient. We will repeat in 1 year. He agrees with plan.      He is on tadalafil 5 mg daily with 10 mg on demand dose, 1-2 tablets PRN.      Plan:   Continue tadalafil 5 mg daily + 10 mg on demand.   Cystoscopy 1 year.   Diagnoses and all orders for this visit:  Bladder tumor

## 2024-03-12 ENCOUNTER — LAB (OUTPATIENT)
Dept: LAB | Facility: LAB | Age: 69
End: 2024-03-12
Payer: MEDICARE

## 2024-03-12 DIAGNOSIS — E55.9 VITAMIN D DEFICIENCY: ICD-10-CM

## 2024-03-12 LAB — 25(OH)D3 SERPL-MCNC: 48 NG/ML (ref 30–100)

## 2024-03-12 PROCEDURE — 36415 COLL VENOUS BLD VENIPUNCTURE: CPT

## 2024-03-12 PROCEDURE — 82306 VITAMIN D 25 HYDROXY: CPT

## 2024-04-12 ENCOUNTER — OFFICE VISIT (OUTPATIENT)
Dept: PRIMARY CARE | Facility: CLINIC | Age: 69
End: 2024-04-12
Payer: MEDICARE

## 2024-04-12 VITALS
BODY MASS INDEX: 33.39 KG/M2 | DIASTOLIC BLOOD PRESSURE: 89 MMHG | RESPIRATION RATE: 18 BRPM | WEIGHT: 233.2 LBS | SYSTOLIC BLOOD PRESSURE: 148 MMHG | HEART RATE: 56 BPM | OXYGEN SATURATION: 95 % | HEIGHT: 70 IN

## 2024-04-12 DIAGNOSIS — N52.9 ERECTILE DYSFUNCTION, UNSPECIFIED ERECTILE DYSFUNCTION TYPE: ICD-10-CM

## 2024-04-12 DIAGNOSIS — M25.561 CHRONIC PAIN OF BOTH KNEES: ICD-10-CM

## 2024-04-12 DIAGNOSIS — I10 PRIMARY HYPERTENSION: ICD-10-CM

## 2024-04-12 DIAGNOSIS — G47.30 SLEEP APNEA, UNSPECIFIED TYPE: ICD-10-CM

## 2024-04-12 DIAGNOSIS — G89.29 CHRONIC PAIN OF BOTH KNEES: ICD-10-CM

## 2024-04-12 DIAGNOSIS — N40.0 BPH WITH ELEVATED PSA: ICD-10-CM

## 2024-04-12 DIAGNOSIS — R97.20 BPH WITH ELEVATED PSA: ICD-10-CM

## 2024-04-12 DIAGNOSIS — K21.9 GASTROESOPHAGEAL REFLUX DISEASE WITHOUT ESOPHAGITIS: ICD-10-CM

## 2024-04-12 DIAGNOSIS — E78.00 PURE HYPERCHOLESTEROLEMIA: ICD-10-CM

## 2024-04-12 DIAGNOSIS — Z82.49 FAMILY HISTORY OF EARLY CAD: ICD-10-CM

## 2024-04-12 DIAGNOSIS — M25.562 CHRONIC PAIN OF BOTH KNEES: ICD-10-CM

## 2024-04-12 DIAGNOSIS — Z00.00 HEALTHCARE MAINTENANCE: ICD-10-CM

## 2024-04-12 PROCEDURE — 3079F DIAST BP 80-89 MM HG: CPT | Performed by: INTERNAL MEDICINE

## 2024-04-12 PROCEDURE — 1159F MED LIST DOCD IN RCRD: CPT | Performed by: INTERNAL MEDICINE

## 2024-04-12 PROCEDURE — 1123F ACP DISCUSS/DSCN MKR DOCD: CPT | Performed by: INTERNAL MEDICINE

## 2024-04-12 PROCEDURE — 1158F ADVNC CARE PLAN TLK DOCD: CPT | Performed by: INTERNAL MEDICINE

## 2024-04-12 PROCEDURE — 1036F TOBACCO NON-USER: CPT | Performed by: INTERNAL MEDICINE

## 2024-04-12 PROCEDURE — 1160F RVW MEDS BY RX/DR IN RCRD: CPT | Performed by: INTERNAL MEDICINE

## 2024-04-12 PROCEDURE — 3077F SYST BP >= 140 MM HG: CPT | Performed by: INTERNAL MEDICINE

## 2024-04-12 PROCEDURE — 99214 OFFICE O/P EST MOD 30 MIN: CPT | Performed by: INTERNAL MEDICINE

## 2024-04-12 PROCEDURE — 3008F BODY MASS INDEX DOCD: CPT | Performed by: INTERNAL MEDICINE

## 2024-04-12 PROCEDURE — 1125F AMNT PAIN NOTED PAIN PRSNT: CPT | Performed by: INTERNAL MEDICINE

## 2024-04-12 RX ORDER — MELOXICAM 15 MG/1
15 TABLET ORAL DAILY PRN
Qty: 90 TABLET | Refills: 0 | Status: SHIPPED | OUTPATIENT
Start: 2024-04-12

## 2024-04-12 RX ORDER — AMLODIPINE BESYLATE 2.5 MG/1
2.5 TABLET ORAL DAILY
Qty: 90 TABLET | Refills: 1 | Status: SHIPPED | OUTPATIENT
Start: 2024-04-12

## 2024-04-12 RX ORDER — LISINOPRIL 20 MG/1
20 TABLET ORAL DAILY
Qty: 90 TABLET | Refills: 1 | Status: SHIPPED | OUTPATIENT
Start: 2024-04-12

## 2024-04-12 RX ORDER — FENOFIBRATE 145 MG/1
145 TABLET, FILM COATED ORAL DAILY
Qty: 90 TABLET | Refills: 1 | Status: SHIPPED | OUTPATIENT
Start: 2024-04-12

## 2024-04-12 ASSESSMENT — ENCOUNTER SYMPTOMS
MYALGIAS: 1
LOSS OF SENSATION IN FEET: 0
ARTHRALGIAS: 1
OCCASIONAL FEELINGS OF UNSTEADINESS: 0
DEPRESSION: 0

## 2024-04-12 ASSESSMENT — PAIN SCALES - GENERAL: PAINLEVEL: 4

## 2024-04-12 ASSESSMENT — PATIENT HEALTH QUESTIONNAIRE - PHQ9
1. LITTLE INTEREST OR PLEASURE IN DOING THINGS: NOT AT ALL
2. FEELING DOWN, DEPRESSED OR HOPELESS: NOT AT ALL
SUM OF ALL RESPONSES TO PHQ9 QUESTIONS 1 AND 2: 0

## 2024-04-12 NOTE — PROGRESS NOTES
"Patient ID:   Van Latham is a 68 y.o. male with PMH remarkable for HTN, BPH s/p robotic simple prostatectomy (benign pathology), GERD, ED, MIREYA on bipap, who presents to the office today for Follow-up (BP has been elevated recently).    HEALTH MAINTENANCE: FU  Smoking: Never a Smoker  Labs: 2023  PSA: 0.62 on 2023  Colonoscopy (45-75): 1/15/2018 with Dr Gee, normal  CT Cardiac Scoring 2023 showed calcium score of 180.9  ECHO 12/15/2023 LVSF 60-65%, aortic valve sclerosis.   - lost weight since last year. 240# 2022 --> 226# on 2023  - on 2024 with Dr Whyte, pt had flex cystourethroscopy due to bladder tumor. He was advised for repeat cystoscopy in 1 year.   -- pt reports that he saw Ortho in the past for bilateral knee pain. 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    - brother recently diagnosed with valve issue, other brother dx with high CT calcium scoring and is undergoing angiogram  - father  at age 56 of cardiac arrest    SOCIAL HISTORY:  Social History     Tobacco Use    Smoking status: Never    Smokeless tobacco: Never   Substance Use Topics    Alcohol use: Not Currently     Alcohol/week: 2.0 standard drinks of alcohol     Types: 2 Glasses of wine per week     Comment: rare    Drug use: Never     IMMUNIZATIONS:  Immunization History   Administered Date(s) Administered    Moderna COVID-19 vaccine, 2023, 12 yeasrs and older (50mcg/0.5mL) 2023    Moderna SARS-CoV-2 Vaccination 2021, 2021, 2021, 2022     REVIEW OF SYSTEMS:  Review of Systems   Musculoskeletal:  Positive for arthralgias and myalgias.        Bilateral knee pain   All other systems reviewed and are negative.    ALLERGIES:  No Known Allergies     Visit Vitals  /89   Pulse 56   Resp 18   Ht 1.778 m (5' 10\")   Wt 106 kg (233 lb 3.2 oz)   SpO2 95%   BMI 33.46 kg/m²   Smoking Status Never   BSA 2.29 m²      Physical Exam  Vitals reviewed.   Constitutional:       General: He is " not in acute distress.     Appearance: Normal appearance. He is not ill-appearing.   HENT:      Head: Normocephalic and atraumatic.      Right Ear: Tympanic membrane and external ear normal.      Left Ear: Tympanic membrane and external ear normal.      Nose: Nose normal.      Mouth/Throat:      Mouth: Mucous membranes are moist.      Pharynx: Oropharynx is clear.   Eyes:      Conjunctiva/sclera: Conjunctivae normal.      Pupils: Pupils are equal, round, and reactive to light.   Cardiovascular:      Rate and Rhythm: Normal rate and regular rhythm.      Heart sounds: Murmur heard.      Systolic murmur is present.   Pulmonary:      Effort: Pulmonary effort is normal. No respiratory distress.      Breath sounds: Normal breath sounds. No wheezing.   Abdominal:      General: There is no distension.      Palpations: Abdomen is soft. There is no mass.      Tenderness: There is no abdominal tenderness.   Musculoskeletal:         General: Normal range of motion.      Cervical back: Normal range of motion and neck supple.   Skin:     General: Skin is warm and dry.   Neurological:      General: No focal deficit present.      Mental Status: He is alert and oriented to person, place, and time.      Sensory: No sensory deficit.      Motor: No weakness.      Coordination: Coordination normal.      Gait: Gait normal.   Psychiatric:         Mood and Affect: Mood normal.         Behavior: Behavior normal.       Current Outpatient Medications   Medication Instructions    amLODIPine (NORVASC) 2.5 mg, oral, Daily    fenofibrate (TRICOR) 145 mg, oral, Daily    lisinopril 20 mg, oral, Daily    meloxicam (MOBIC) 15 mg, oral, Daily PRN    omeprazole (PRILOSEC) 20 mg, oral, Daily    rosuvastatin (CRESTOR) 10 mg, oral, Daily    tadalafil (CIALIS) 10 mg, oral, Daily PRN    tadalafil (CIALIS) 5 mg, oral, Daily      LABORATORY DATA:  Lab Results   Component Value Date    WBC 6.2 12/13/2023    HGB 13.7 12/13/2023    HCT 43.2 12/13/2023      12/13/2023    CHOL 133 12/13/2023    TRIG 143 12/13/2023    HDL 28.0 12/13/2023    ALT 22 12/13/2023    AST 26 12/13/2023     12/13/2023    K 4.3 12/13/2023     12/13/2023    CREATININE 1.12 12/13/2023    BUN 20 12/13/2023    CO2 28 12/13/2023    TSH 2.09 12/13/2023    PSA 5.6 (H) 12/20/2021    INR 1.1 03/07/2022    HGBA1C 5.8 (A) 11/23/2021     Problem List Items Addressed This Visit             ICD-10-CM    Primary hypertension I10     - reviewed BP log on pt's phone. Max SBP was 160  - c/w lisinopril 20mg every day   - refill sent today  - start on amlodipine 2.5mg daily  - side effects reviewed with pt         Relevant Medications    lisinopril 20 mg tablet    amLODIPine (Norvasc) 2.5 mg tablet    BPH with elevated PSA N40.0, R97.20     #BPH s/p robotic simple prostatectomy (benign pathology)  - following with Dr Whyte for elevated PSA         GERD (gastroesophageal reflux disease) K21.9     - c/w PPI         Pure hypercholesterolemia E78.00     - c/w statin  - last lipid panel on 12/13/2023 reviewed         Relevant Medications    fenofibrate (Tricor) 145 mg tablet    Erectile dysfunction N52.9    Sleep apnea G47.30     - c/w bipap         Chronic pain of both knees M25.561, M25.562, G89.29     - saw ortho in the past for this and was told that 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 Mckinnon. This note accurately reflects the work and decisions made by Dr. Mckinnon.     I, Dr. Mckinnon, attest all medical record entries made by the scribe were under my direction and were personally dictated by me. I have  reviewed the chart and agree that the record accurately reflects my performance of the history, physical exam, and assessment and plan.

## 2024-04-12 NOTE — ASSESSMENT & PLAN NOTE
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

## 2024-04-12 NOTE — ASSESSMENT & PLAN NOTE
- reviewed BP log on pt's phone. Max SBP was 160  - c/w lisinopril 20mg every day   - refill sent today  - start on amlodipine 2.5mg daily  - side effects reviewed with pt

## 2024-04-12 NOTE — ASSESSMENT & PLAN NOTE
#BPH s/p robotic simple prostatectomy (benign pathology)  - following with Dr Whyte for elevated PSA

## 2024-06-10 DIAGNOSIS — E78.00 PURE HYPERCHOLESTEROLEMIA: ICD-10-CM

## 2024-06-10 DIAGNOSIS — K21.9 GASTROESOPHAGEAL REFLUX DISEASE WITHOUT ESOPHAGITIS: ICD-10-CM

## 2024-06-10 RX ORDER — ROSUVASTATIN CALCIUM 10 MG/1
10 TABLET, COATED ORAL DAILY
Qty: 90 TABLET | Refills: 0 | Status: SHIPPED | OUTPATIENT
Start: 2024-06-10

## 2024-06-10 RX ORDER — OMEPRAZOLE 20 MG/1
20 CAPSULE, DELAYED RELEASE ORAL DAILY
Qty: 90 CAPSULE | Refills: 0 | Status: SHIPPED | OUTPATIENT
Start: 2024-06-10

## 2024-08-16 ENCOUNTER — APPOINTMENT (OUTPATIENT)
Dept: PRIMARY CARE | Facility: CLINIC | Age: 69
End: 2024-08-16
Payer: MEDICARE

## 2024-08-19 ASSESSMENT — ENCOUNTER SYMPTOMS
ARTHRALGIAS: 1
MYALGIAS: 1

## 2024-08-20 ENCOUNTER — APPOINTMENT (OUTPATIENT)
Dept: PRIMARY CARE | Facility: CLINIC | Age: 69
End: 2024-08-20
Payer: MEDICARE

## 2024-08-20 VITALS
HEART RATE: 50 BPM | RESPIRATION RATE: 18 BRPM | SYSTOLIC BLOOD PRESSURE: 143 MMHG | OXYGEN SATURATION: 95 % | BODY MASS INDEX: 32.21 KG/M2 | HEIGHT: 70 IN | WEIGHT: 225 LBS | DIASTOLIC BLOOD PRESSURE: 78 MMHG

## 2024-08-20 DIAGNOSIS — Z00.00 HEALTH CARE MAINTENANCE: ICD-10-CM

## 2024-08-20 DIAGNOSIS — I10 PRIMARY HYPERTENSION: ICD-10-CM

## 2024-08-20 DIAGNOSIS — K21.9 GASTROESOPHAGEAL REFLUX DISEASE WITHOUT ESOPHAGITIS: ICD-10-CM

## 2024-08-20 DIAGNOSIS — R01.1 MURMUR, CARDIAC: ICD-10-CM

## 2024-08-20 DIAGNOSIS — N52.9 ERECTILE DYSFUNCTION, UNSPECIFIED ERECTILE DYSFUNCTION TYPE: ICD-10-CM

## 2024-08-20 DIAGNOSIS — E78.00 PURE HYPERCHOLESTEROLEMIA: ICD-10-CM

## 2024-08-20 DIAGNOSIS — R42 DIZZINESS AND GIDDINESS: ICD-10-CM

## 2024-08-20 PROCEDURE — 1125F AMNT PAIN NOTED PAIN PRSNT: CPT | Performed by: INTERNAL MEDICINE

## 2024-08-20 PROCEDURE — 3008F BODY MASS INDEX DOCD: CPT | Performed by: INTERNAL MEDICINE

## 2024-08-20 PROCEDURE — 3077F SYST BP >= 140 MM HG: CPT | Performed by: INTERNAL MEDICINE

## 2024-08-20 PROCEDURE — 1160F RVW MEDS BY RX/DR IN RCRD: CPT | Performed by: INTERNAL MEDICINE

## 2024-08-20 PROCEDURE — 3078F DIAST BP <80 MM HG: CPT | Performed by: INTERNAL MEDICINE

## 2024-08-20 PROCEDURE — 1036F TOBACCO NON-USER: CPT | Performed by: INTERNAL MEDICINE

## 2024-08-20 PROCEDURE — 99213 OFFICE O/P EST LOW 20 MIN: CPT | Performed by: INTERNAL MEDICINE

## 2024-08-20 PROCEDURE — 1123F ACP DISCUSS/DSCN MKR DOCD: CPT | Performed by: INTERNAL MEDICINE

## 2024-08-20 PROCEDURE — 1159F MED LIST DOCD IN RCRD: CPT | Performed by: INTERNAL MEDICINE

## 2024-08-20 RX ORDER — SILDENAFIL 100 MG/1
100 TABLET, FILM COATED ORAL DAILY PRN
COMMUNITY
End: 2024-08-20 | Stop reason: SDUPTHER

## 2024-08-20 RX ORDER — SILDENAFIL 100 MG/1
100 TABLET, FILM COATED ORAL DAILY PRN
Qty: 10 TABLET | Refills: 1 | Status: SHIPPED | OUTPATIENT
Start: 2024-08-20 | End: 2024-09-19

## 2024-08-20 ASSESSMENT — PATIENT HEALTH QUESTIONNAIRE - PHQ9
2. FEELING DOWN, DEPRESSED OR HOPELESS: NOT AT ALL
1. LITTLE INTEREST OR PLEASURE IN DOING THINGS: NOT AT ALL
SUM OF ALL RESPONSES TO PHQ9 QUESTIONS 1 AND 2: 0

## 2024-08-20 ASSESSMENT — PAIN SCALES - GENERAL: PAINLEVEL: 2

## 2024-08-20 NOTE — PROGRESS NOTES
Goal Outcome Evaluation:  Plan of Care Reviewed With: patient        Progress: improving  Outcome Evaluation: VSS. S/ST  on tele. HR has remained <100 since 1300hrs.  BP has been consistent all shift at 140s/80s. No c/o pain this shift. Anticipate d/c tomorrow.          Patient ID:   Van Latham is a 69 y.o. male with PMH remarkable for HTN, BPH s/p robotic simple prostatectomy (benign pathology), GERD, ED, MIREYA on bipap, who presents to the office today for Follow-up.    HEALTH MAINTENANCE: FU  Smoking: Never a Smoker  Labs: 2023  PSA: 0.62 on 2023  Colonoscopy (45-75): 1/15/2018 with Dr Gee, normal. Repeat due .  CT Cardiac Scoring 2023 showed calcium score of 180.9  ECHO 12/15/2023 LVSF 60-65%, aortic valve sclerosis.   - lost weight since last year. 240# 2022 --> 226# on 2023 --> 233 on 2024 -> 225# today  - on 2024 with Dr Whyte, pt had flex cystourethroscopy due to bladder tumor. He was advised for repeat cystoscopy in 1 year.   -- pt reports that he saw Ortho in the past for bilateral knee pain. 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    - brother recently diagnosed with valve issue, other brother dx with high CT calcium scoring and is undergoing angiogram  - father  at age 56 of cardiac arrest    Reports that he had rapid heart rate for 10 minutes a couple weeks ago. His watch picked up on this, it was reported at 130-170. [place] lightheaded at this time. Before coffee, nothing strenuous was done.     Social History     Tobacco Use    Smoking status: Never    Smokeless tobacco: Never   Substance Use Topics    Alcohol use: Not Currently     Alcohol/week: 2.0 standard drinks of alcohol     Types: 2 Glasses of wine per week     Comment: rare    Drug use: Never     Immunization History   Administered Date(s) Administered    Moderna COVID-19 vaccine, Fall , 12 yeasrs and older (50mcg/0.5mL) 2023    Moderna SARS-CoV-2 Vaccination 2021, 2021, 2021, 2022     Review of Systems   Cardiovascular:  Positive for palpitations.   Musculoskeletal:  Positive for arthralgias and myalgias.        Bilateral knee pain   All other systems reviewed and are negative.    No Known Allergies     Visit Vitals  /78 (BP  "Location: Left arm, Patient Position: Sitting)   Pulse 50   Resp 18   Ht 1.778 m (5' 10\")   Wt 102 kg (225 lb)   SpO2 95%   BMI 32.28 kg/m²   Smoking Status Never   BSA 2.24 m²      Physical Exam  Vitals reviewed.   Constitutional:       General: He is not in acute distress.     Appearance: Normal appearance. He is not ill-appearing.   HENT:      Head: Normocephalic and atraumatic.      Right Ear: Tympanic membrane and external ear normal.      Left Ear: Tympanic membrane and external ear normal.      Nose: Nose normal.      Mouth/Throat:      Mouth: Mucous membranes are moist.      Pharynx: Oropharynx is clear.   Eyes:      Conjunctiva/sclera: Conjunctivae normal.      Pupils: Pupils are equal, round, and reactive to light.   Cardiovascular:      Rate and Rhythm: Normal rate and regular rhythm.      Heart sounds: Murmur heard.      Systolic murmur is present.   Pulmonary:      Effort: Pulmonary effort is normal. No respiratory distress.      Breath sounds: Normal breath sounds. No wheezing.   Abdominal:      General: There is no distension.      Palpations: Abdomen is soft. There is no mass.      Tenderness: There is no abdominal tenderness.   Musculoskeletal:         General: Normal range of motion.      Cervical back: Normal range of motion and neck supple.   Skin:     General: Skin is warm and dry.   Neurological:      General: No focal deficit present.      Mental Status: He is alert and oriented to person, place, and time.      Sensory: No sensory deficit.      Motor: No weakness.      Coordination: Coordination normal.      Gait: Gait normal.   Psychiatric:         Mood and Affect: Mood normal.         Behavior: Behavior normal.       Current Outpatient Medications   Medication Instructions    amLODIPine (NORVASC) 2.5 mg, oral, Daily    fenofibrate (TRICOR) 145 mg, oral, Daily    lisinopril 20 mg, oral, Daily    meloxicam (MOBIC) 15 mg, oral, Daily PRN    omeprazole (PRILOSEC) 20 mg, oral, Daily    " rosuvastatin (CRESTOR) 10 mg, oral, Daily    sildenafil (VIAGRA) 100 mg, oral, Daily PRN    tadalafil (CIALIS) 10 mg, oral, Daily PRN    tadalafil (CIALIS) 5 mg, oral, Daily      Lab Results   Component Value Date    WBC 6.2 12/13/2023    HGB 13.7 12/13/2023    HCT 43.2 12/13/2023     12/13/2023    CHOL 133 12/13/2023    TRIG 143 12/13/2023    HDL 28.0 12/13/2023    ALT 22 12/13/2023    AST 26 12/13/2023     12/13/2023    K 4.3 12/13/2023     12/13/2023    CREATININE 1.12 12/13/2023    BUN 20 12/13/2023    CO2 28 12/13/2023    TSH 2.09 12/13/2023    PSA 5.6 (H) 12/20/2021    INR 1.1 03/07/2022    HGBA1C 5.8 (A) 11/23/2021     Problem List Items Addressed This Visit             ICD-10-CM    Primary hypertension I10     - c/w lisinopril 20mg every day and amlodipine 2.5mg         GERD (gastroesophageal reflux disease) K21.9     - c/w PPI         Pure hypercholesterolemia E78.00     - c/w statin, fenofibrate  - last lipid panel on 12/13/2023 reviewed         Erectile dysfunction N52.9     - ok to refill viagra PRN  - follows with urology         Relevant Medications    sildenafil (Viagra) 100 mg tablet    Murmur, cardiac R01.1     CT Cardiac Scoring 6/2023 showed calcium score of 180.9  ECHO 12/15/2023 LVSF 60-65%, aortic valve sclerosis.   - will order zio patch         Relevant Orders    Holter or Event Cardiac Monitor    Health [facility] maintenance Z00.00    Dizziness and giddiness R42     - had an episode of palpitations  - will order zio patch to rule out cardiac arrhthymias         Relevant Orders    Holter or Event Cardiac Monitor         --------------------  Written by Rosa Morataya RN, acting as a scribe for Dr. Mckinnon. This note accurately reflects the work and decisions made by Dr. Mckinnon.     I, Dr. Mckinnon, attest all medical record entries made by the scribe were under my direction and were personally dictated by me. I have reviewed the chart and agree that the record accurately reflects my  performance of the history, physical exam, and assessment and plan.

## 2024-08-20 NOTE — PATIENT INSTRUCTIONS
It was nice to see you in the office today!  As discussed during our visit...     Dr Mckinnon has ordered a Zio patch for you to wear to rule out any cardiac arrhythmias. Someone will reach out to you soon regarding setting this up.  
no

## 2024-08-21 PROBLEM — R42 DIZZINESS AND GIDDINESS: Status: ACTIVE | Noted: 2024-08-21

## 2024-08-21 ASSESSMENT — ENCOUNTER SYMPTOMS: PALPITATIONS: 1

## 2024-08-21 NOTE — ASSESSMENT & PLAN NOTE
CT Cardiac Scoring 6/2023 showed calcium score of 180.9  ECHO 12/15/2023 LVSF 60-65%, aortic valve sclerosis.   - will order zio patch

## 2024-08-26 DIAGNOSIS — N52.9 ERECTILE DYSFUNCTION, UNSPECIFIED ERECTILE DYSFUNCTION TYPE: ICD-10-CM

## 2024-08-26 RX ORDER — SILDENAFIL 100 MG/1
100 TABLET, FILM COATED ORAL DAILY PRN
Qty: 10 TABLET | Refills: 1 | Status: SHIPPED | OUTPATIENT
Start: 2024-08-26 | End: 2024-09-25

## 2024-08-27 ENCOUNTER — HOSPITAL ENCOUNTER (OUTPATIENT)
Dept: CARDIOLOGY | Facility: HOSPITAL | Age: 69
Discharge: HOME | End: 2024-08-27
Payer: MEDICARE

## 2024-08-27 DIAGNOSIS — R01.1 MURMUR, CARDIAC: ICD-10-CM

## 2024-08-27 DIAGNOSIS — R42 DIZZINESS AND GIDDINESS: ICD-10-CM

## 2024-08-27 PROCEDURE — 93246 EXT ECG>7D<15D RECORDING: CPT

## 2024-09-08 DIAGNOSIS — E78.00 PURE HYPERCHOLESTEROLEMIA: ICD-10-CM

## 2024-09-08 DIAGNOSIS — K21.9 GASTROESOPHAGEAL REFLUX DISEASE WITHOUT ESOPHAGITIS: ICD-10-CM

## 2024-09-09 RX ORDER — OMEPRAZOLE 20 MG/1
20 CAPSULE, DELAYED RELEASE ORAL DAILY
Qty: 90 CAPSULE | Refills: 0 | Status: SHIPPED | OUTPATIENT
Start: 2024-09-09

## 2024-09-09 RX ORDER — ROSUVASTATIN CALCIUM 10 MG/1
10 TABLET, COATED ORAL DAILY
Qty: 90 TABLET | Refills: 0 | Status: SHIPPED | OUTPATIENT
Start: 2024-09-09

## 2024-09-13 DIAGNOSIS — I10 PRIMARY HYPERTENSION: ICD-10-CM

## 2024-09-13 RX ORDER — AMLODIPINE BESYLATE 2.5 MG/1
2.5 TABLET ORAL DAILY
Qty: 90 TABLET | Refills: 1 | Status: SHIPPED | OUTPATIENT
Start: 2024-09-13

## 2024-10-09 ENCOUNTER — OFFICE VISIT (OUTPATIENT)
Dept: CARDIOLOGY | Facility: HOSPITAL | Age: 69
End: 2024-10-09
Payer: MEDICARE

## 2024-10-09 VITALS
DIASTOLIC BLOOD PRESSURE: 68 MMHG | BODY MASS INDEX: 32.93 KG/M2 | HEART RATE: 86 BPM | OXYGEN SATURATION: 95 % | WEIGHT: 229.5 LBS | SYSTOLIC BLOOD PRESSURE: 142 MMHG

## 2024-10-09 DIAGNOSIS — I10 PRIMARY HYPERTENSION: ICD-10-CM

## 2024-10-09 DIAGNOSIS — I48.19 PERSISTENT ATRIAL FIBRILLATION (MULTI): ICD-10-CM

## 2024-10-09 DIAGNOSIS — R42 DIZZINESS AND GIDDINESS: ICD-10-CM

## 2024-10-09 DIAGNOSIS — R06.09 DOE (DYSPNEA ON EXERTION): ICD-10-CM

## 2024-10-09 DIAGNOSIS — I25.10 CORONARY ARTERIOSCLEROSIS: ICD-10-CM

## 2024-10-09 DIAGNOSIS — R01.1 MURMUR, CARDIAC: Primary | ICD-10-CM

## 2024-10-09 DIAGNOSIS — R94.31 ABNORMAL EKG: ICD-10-CM

## 2024-10-09 DIAGNOSIS — I47.29 NSVT (NONSUSTAINED VENTRICULAR TACHYCARDIA) (MULTI): ICD-10-CM

## 2024-10-09 PROCEDURE — G2211 COMPLEX E/M VISIT ADD ON: HCPCS | Performed by: STUDENT IN AN ORGANIZED HEALTH CARE EDUCATION/TRAINING PROGRAM

## 2024-10-09 PROCEDURE — 3077F SYST BP >= 140 MM HG: CPT | Performed by: STUDENT IN AN ORGANIZED HEALTH CARE EDUCATION/TRAINING PROGRAM

## 2024-10-09 PROCEDURE — 99205 OFFICE O/P NEW HI 60 MIN: CPT | Performed by: STUDENT IN AN ORGANIZED HEALTH CARE EDUCATION/TRAINING PROGRAM

## 2024-10-09 PROCEDURE — 1159F MED LIST DOCD IN RCRD: CPT | Performed by: STUDENT IN AN ORGANIZED HEALTH CARE EDUCATION/TRAINING PROGRAM

## 2024-10-09 PROCEDURE — 93005 ELECTROCARDIOGRAM TRACING: CPT | Performed by: STUDENT IN AN ORGANIZED HEALTH CARE EDUCATION/TRAINING PROGRAM

## 2024-10-09 PROCEDURE — 99215 OFFICE O/P EST HI 40 MIN: CPT | Performed by: STUDENT IN AN ORGANIZED HEALTH CARE EDUCATION/TRAINING PROGRAM

## 2024-10-09 PROCEDURE — 3078F DIAST BP <80 MM HG: CPT | Performed by: STUDENT IN AN ORGANIZED HEALTH CARE EDUCATION/TRAINING PROGRAM

## 2024-10-09 PROCEDURE — 1123F ACP DISCUSS/DSCN MKR DOCD: CPT | Performed by: STUDENT IN AN ORGANIZED HEALTH CARE EDUCATION/TRAINING PROGRAM

## 2024-10-09 RX ORDER — METOPROLOL SUCCINATE 25 MG/1
25 TABLET, EXTENDED RELEASE ORAL DAILY
Qty: 30 TABLET | Refills: 0 | Status: CANCELLED | OUTPATIENT
Start: 2024-10-09 | End: 2024-11-08

## 2024-10-09 RX ORDER — ROSUVASTATIN CALCIUM 20 MG/1
20 TABLET, COATED ORAL DAILY
Qty: 30 TABLET | Refills: 0 | Status: SHIPPED | OUTPATIENT
Start: 2024-10-09 | End: 2025-10-09

## 2024-10-09 RX ORDER — METOPROLOL SUCCINATE 50 MG/1
50 TABLET, EXTENDED RELEASE ORAL DAILY
Qty: 30 TABLET | Refills: 11 | Status: SHIPPED | OUTPATIENT
Start: 2024-10-09 | End: 2024-10-10 | Stop reason: SDUPTHER

## 2024-10-09 NOTE — PROGRESS NOTES
Primary Care Physician: Iraj Merritt MD   Date of Visit: 10/09/2024  3:20 PM EDT  Type of Visit: new      Chief Complaint:  No chief complaint on file.       HPI  Van Latham 69 y.o. male with hx of HTN, HLD, GERD referred for CT Ca score 180    Has intermittent sob and palpitations  He gets sob with exertion  No chest pain  He had a syncopal episode a year ago after he stood up really quick  No le edema  No bleeding issues  -140/70s     Stress test long time ago  Wears cpap mask religiously     Review of Systems   Review of Systems   12 points review of systems are negative expect for the above    Social History:  Social History     Socioeconomic History    Marital status:      Spouse name: Not on file    Number of children: Not on file    Years of education: Not on file    Highest education level: Not on file   Occupational History    Not on file   Tobacco Use    Smoking status: Never    Smokeless tobacco: Never   Substance and Sexual Activity    Alcohol use: Not Currently     Alcohol/week: 2.0 standard drinks of alcohol     Types: 2 Glasses of wine per week     Comment: rare    Drug use: Never    Sexual activity: Yes     Partners: Female     Birth control/protection: None   Other Topics Concern    Not on file   Social History Narrative    Not on file     Social Determinants of Health     Financial Resource Strain: Not on file   Food Insecurity: Not on file   Transportation Needs: Not on file   Physical Activity: Not on file   Stress: Not on file   Social Connections: Not on file   Intimate Partner Violence: Not on file   Housing Stability: Not on file        Past Medical History:  Past Medical History:   Diagnosis Date    Acute bronchitis 12/12/2023    Acute laryngitis 12/12/2023    Acute lower UTI 12/12/2023    Acute pharyngitis 12/12/2023    Arthritis 1/15/2020    Body mass index (BMI) 33.0-33.9, adult 05/17/2021    Body mass index (BMI) of 33.0 to 33.9 in adult    Body mass index (BMI)  "33.0-33.9, adult 11/11/2019    BMI 33.0-33.9,adult    Chest pressure 12/12/2023    Daytime somnolence 12/12/2023    Fatigue 12/12/2023    GERD (gastroesophageal reflux disease) 6/1/2013    Hypercholesteremia 12/12/2023    Hypertension 6/1/2016    Occult blood in stools 12/12/2023    Pain of left heel 12/12/2023    Pure hypercholesterolemia, unspecified 12/13/2022    Hypercholesteremia    Pure hyperglyceridemia     Hypertriglyceridemia    Snoring 12/12/2023    Varicella 12/15/1961       Past Surgical History:  Past Surgical History:   Procedure Laterality Date    CIRCUMCISION, PRIMARY  1956    FRACTURE SURGERY  8/15/1963    PROSTATE SURGERY  08/2022    VASECTOMY  7/1/1986       Family History:  Family History   Problem Relation Name Age of Onset    Other (passed of heart attack) Father Prashanth     Hypertension Father Prashanth     Cancer Sister Sade     Miscarriages / Stillbirths Mother Saritha     Hypertension Brother Jeffrey     Intellectual Disability Brother Jonathan         Objective:       5/17/2022     3:42 PM 12/13/2022     7:48 AM 6/13/2023     7:42 AM 12/12/2023    11:16 AM 4/12/2024     9:15 AM 8/20/2024    11:46 AM 10/9/2024     3:28 PM   Vitals   Systolic 136 149 146 132 148 143 142   Diastolic 84 88 90 82 89 78 68   Heart Rate 62 69 61 63 56 50 86   Resp 18 18 18  18 18    Height (in) 1.778 m (5' 10\") 1.778 m (5' 10\") 1.778 m (5' 10\") 1.778 m (5' 10\") 1.778 m (5' 10\") 1.778 m (5' 10\")    Weight (lb) 228.5 240.38 228 226.8 233.2 225 229.5   BMI 32.79 kg/m2 34.49 kg/m2 32.71 kg/m2 32.54 kg/m2 33.46 kg/m2 32.28 kg/m2 32.93 kg/m2   BSA (m2) 2.27 m2 2.32 m2 2.26 m2 2.26 m2 2.29 m2 2.24 m2 2.27 m2   Visit Report   Report Report Report Report Report      Constitutional:       Appearance: Healthy appearance. Not in distress.   Neck:      Vascular: No JVR. JVD normal.   Pulmonary:      Effort: Pulmonary effort is normal.      Breath sounds: Normal breath sounds. No wheezing. No rhonchi. No rales.   Chest:      Chest " wall: Not tender to palpatation.   Cardiovascular:      IRR     Murmurs: There is no murmur.      No gallop.  N No rub.   Pulses:     Intact distal pulses.   Edema:     Peripheral edema absent.   Abdominal:      General: Bowel sounds are normal.      Palpations: Abdomen is soft.      Tenderness: There is no abdominal tenderness.   Musculoskeletal: Normal range of motion.         General: No tenderness.   Skin:     General: Skin is warm and dry.   Neurological:      General: No focal deficit present.      Mental Status: Alert and oriented to person, place and time.     Allergies:  No Known Allergies    Medications:  Current Outpatient Medications   Medication Instructions    amLODIPine (NORVASC) 2.5 mg, oral, Daily    fenofibrate (TRICOR) 145 mg, oral, Daily    lisinopril 20 mg, oral, Daily    meloxicam (MOBIC) 15 mg, oral, Daily PRN    omeprazole (PRILOSEC) 20 mg, oral, Daily    rosuvastatin (CRESTOR) 10 mg, oral, Daily    sildenafil (VIAGRA) 100 mg, oral, Daily PRN    tadalafil (CIALIS) 10 mg, oral, Daily PRN    tadalafil (CIALIS) 5 mg, oral, Daily        Labs and Imaging:     Lab Results   Component Value Date    WBC 6.2 12/13/2023    HGB 13.7 12/13/2023    HCT 43.2 12/13/2023     12/13/2023    CHOL 133 12/13/2023    TRIG 143 12/13/2023    HDL 28.0 12/13/2023    ALT 22 12/13/2023    AST 26 12/13/2023     12/13/2023    K 4.3 12/13/2023     12/13/2023    CREATININE 1.12 12/13/2023    BUN 20 12/13/2023    CO2 28 12/13/2023    TSH 2.09 12/13/2023    PSA 5.6 (H) 12/20/2021    INR 1.1 03/07/2022    HGBA1C 5.8 (A) 11/23/2021         Echocardiogram: No results found for this or any previous visit from the past 1825 days.    Stress Testing: No results found for this or any previous visit from the past 1825 days.    Cardiac Catheterization: No results found for this or any previous visit from the past 1825 days.    Cardiac Scoring:   CT cardiac scoring wo IV contrast 06/27/2023    Narrative  Interpreted  "By:  HELADIO WHEELER MD  MRN: [id number]  Patient Name: DARIAN HAMM    STUDY:  CT CARDIAC SCORING;  6/27/2023 7:42 am    INDICATION:  HTN, heart risk scoring.    COMPARISON:  None.    ACCESSION NUMBER(S):  [id number]    ORDERING CLINICIAN:  JP RUBY    TECHNIQUE:  Using prospective ECG gating, CT scan of the coronary arteries was  performed without intravenous contrast. Coronary calcium scoring  was  performed according to the method of Agatston.    FINDINGS:  The score and distribution of calcium in the coronary arteries is as  follows:    LM 0,  LAD 68.93,  LCx 28.06,  RCA 83.91,    Total 180.9    The visualized mid/lower ascending thoracic aorta measures 3.3 cm in  diameter. The heart is normal in size. No pericardial effusion is  present.    No gross mediastinal or hilar lymphadenopathy or mass is identified.  The visualized segments of the lungs are clear aside from faint  \"ground-glass\" changes in the basal left lower lobe and medial right  lower lobe that may be subsegmental atelectasis or chronic but are  not entirely specific and should be correlated clinically.    The visualized subdiaphragmatic structures appear normal.    Impression  1. Coronary artery calcium score of 180.9*.    *Coronary artery calcium scoring may be helpful in predicting the  risk for future coronary heart disease events.  According to the  American College of Cardiology Foundation Clinical Expert Consensus  Task Force, such testing provides important prognostic information in  patients with more than one coronary heart disease risk factor. The  coronary artery calcium score correlates with the annual risk of a  non-fatal myocardial infarction or coronary heart disease death.    Coronary artery score            Annual Risk    0-99                             0.4%  100-399                        1.3%  >400                            2.4%    These three \"breakpoints\" correspond to lower, intermediate and high  risk states for " future coronary events.  Such information should be  used, along with appropriate clinical judgment, to make decisions  regarding the intensity of risk factor management strategies to treat  blood lipids and to modify other non-lipid coronary risk factors.    Reference: [place] P et al. Circulation.  2007; 115:402-426    AAA : No results found for this or any previous visit from the past 1825 days.    OTHER: No results found for this or any previous visit from the past 1825 days.      The 10-year ASCVD risk score (Maykel DK, et al., 2019) is: 22.6%    Values used to calculate the score:      Age: 69 years      Sex: Male      Is Non- : No      Diabetic: No      Tobacco smoker: No      Systolic Blood Pressure: 142 mmHg      Is BP treated: Yes      HDL Cholesterol: 28 mg/dL      Total Cholesterol: 133 mg/dL     Assessment/Plan:   1. Murmur, cardiac  ECG 12 lead (Clinic Performed)      2. Dizziness and giddiness        3. Primary hypertension        4. Paroxysmal atrial fibrillation (Multi)           Van Latham 69 y.o. male with hx of MIREYA on CPAP, HTN, HLD, GERD referred for CT Ca score 180. ECHO 12/15/2023 LVSF 60-65%, aortic valve sclerosis.     Zio with afib 25% and 2 short episodes of NSVT    EKG with afib + rvr with     Plan   Agree with fenofibrate   Increase crestor 20 mg every day   Start eliquis    Start metoprolol  Get a stress nuc    Will get an echo since afib is new  Will do ABBIE/DCV, dicussed risks and benefits  Discussed ablation and AAD options, he will think about both, will refer to EP   He will continue to monitor his HR with smart watch, I showed him how to do that       High complex         Time Spent: I spent  minutes reviewing medical testing, obtaining medical history and counselling and educating on diagnosis and documenting clinical encounter.         ____________________________________________________________  Piotr Sood MD   of  Medicine  Division of Cardiovascular Medicine   [facility] [place]  [facility]

## 2024-10-10 DIAGNOSIS — I25.10 CORONARY ARTERIOSCLEROSIS: ICD-10-CM

## 2024-10-10 DIAGNOSIS — R42 DIZZINESS AND GIDDINESS: ICD-10-CM

## 2024-10-10 DIAGNOSIS — R94.31 ABNORMAL EKG: ICD-10-CM

## 2024-10-10 DIAGNOSIS — R06.09 DOE (DYSPNEA ON EXERTION): ICD-10-CM

## 2024-10-10 DIAGNOSIS — I47.29 NSVT (NONSUSTAINED VENTRICULAR TACHYCARDIA) (MULTI): ICD-10-CM

## 2024-10-10 DIAGNOSIS — I10 PRIMARY HYPERTENSION: ICD-10-CM

## 2024-10-10 DIAGNOSIS — I48.19 PERSISTENT ATRIAL FIBRILLATION (MULTI): ICD-10-CM

## 2024-10-10 DIAGNOSIS — R01.1 MURMUR, CARDIAC: ICD-10-CM

## 2024-10-10 LAB
ATRIAL RATE: 182 BPM
Q ONSET: 213 MS
QRS COUNT: 25 BEATS
QRS DURATION: 90 MS
QT INTERVAL: 284 MS
QTC CALCULATION(BAZETT): 442 MS
QTC FREDERICIA: 381 MS
R AXIS: 33 DEGREES
T AXIS: 173 DEGREES
T OFFSET: 355 MS
VENTRICULAR RATE: 146 BPM

## 2024-10-10 RX ORDER — METOPROLOL SUCCINATE 50 MG/1
50 TABLET, EXTENDED RELEASE ORAL DAILY
Qty: 90 TABLET | Refills: 3 | Status: SHIPPED | OUTPATIENT
Start: 2024-10-10 | End: 2024-10-10 | Stop reason: SDUPTHER

## 2024-10-10 RX ORDER — METOPROLOL SUCCINATE 50 MG/1
50 TABLET, EXTENDED RELEASE ORAL DAILY
Qty: 30 TABLET | Refills: 0 | Status: SHIPPED | OUTPATIENT
Start: 2024-10-10 | End: 2025-10-10

## 2024-10-23 ENCOUNTER — OFFICE VISIT (OUTPATIENT)
Dept: CARDIOLOGY | Facility: CLINIC | Age: 69
End: 2024-10-23
Payer: MEDICARE

## 2024-10-23 VITALS
HEIGHT: 70 IN | WEIGHT: 223 LBS | SYSTOLIC BLOOD PRESSURE: 118 MMHG | BODY MASS INDEX: 31.92 KG/M2 | DIASTOLIC BLOOD PRESSURE: 78 MMHG | HEART RATE: 52 BPM

## 2024-10-23 DIAGNOSIS — R00.2 PALPITATIONS: Primary | ICD-10-CM

## 2024-10-23 DIAGNOSIS — I48.0 PAROXYSMAL ATRIAL FIBRILLATION (MULTI): ICD-10-CM

## 2024-10-23 PROCEDURE — 93005 ELECTROCARDIOGRAM TRACING: CPT | Performed by: STUDENT IN AN ORGANIZED HEALTH CARE EDUCATION/TRAINING PROGRAM

## 2024-10-23 PROCEDURE — 99214 OFFICE O/P EST MOD 30 MIN: CPT | Performed by: STUDENT IN AN ORGANIZED HEALTH CARE EDUCATION/TRAINING PROGRAM

## 2024-10-23 ASSESSMENT — PAIN SCALES - GENERAL: PAINLEVEL_OUTOF10: 0-NO PAIN

## 2024-10-23 ASSESSMENT — ENCOUNTER SYMPTOMS
OCCASIONAL FEELINGS OF UNSTEADINESS: 0
LOSS OF SENSATION IN FEET: 0
DEPRESSION: 0

## 2024-10-23 ASSESSMENT — COLUMBIA-SUICIDE SEVERITY RATING SCALE - C-SSRS
6. HAVE YOU EVER DONE ANYTHING, STARTED TO DO ANYTHING, OR PREPARED TO DO ANYTHING TO END YOUR LIFE?: NO
2. HAVE YOU ACTUALLY HAD ANY THOUGHTS OF KILLING YOURSELF?: NO
1. IN THE PAST MONTH, HAVE YOU WISHED YOU WERE DEAD OR WISHED YOU COULD GO TO SLEEP AND NOT WAKE UP?: NO

## 2024-10-27 DIAGNOSIS — E78.00 PURE HYPERCHOLESTEROLEMIA: ICD-10-CM

## 2024-10-28 RX ORDER — FENOFIBRATE 145 MG/1
145 TABLET, FILM COATED ORAL DAILY
Qty: 90 TABLET | Refills: 0 | Status: SHIPPED | OUTPATIENT
Start: 2024-10-28

## 2024-10-31 DIAGNOSIS — I10 PRIMARY HYPERTENSION: ICD-10-CM

## 2024-10-31 DIAGNOSIS — R94.31 ABNORMAL EKG: ICD-10-CM

## 2024-10-31 DIAGNOSIS — R42 DIZZINESS AND GIDDINESS: ICD-10-CM

## 2024-10-31 DIAGNOSIS — I25.10 CORONARY ARTERIOSCLEROSIS: ICD-10-CM

## 2024-10-31 DIAGNOSIS — R06.09 DOE (DYSPNEA ON EXERTION): ICD-10-CM

## 2024-10-31 DIAGNOSIS — R01.1 MURMUR, CARDIAC: ICD-10-CM

## 2024-10-31 DIAGNOSIS — I48.19 PERSISTENT ATRIAL FIBRILLATION (MULTI): ICD-10-CM

## 2024-10-31 DIAGNOSIS — I47.29 NSVT (NONSUSTAINED VENTRICULAR TACHYCARDIA) (MULTI): ICD-10-CM

## 2024-10-31 RX ORDER — ROSUVASTATIN CALCIUM 20 MG/1
20 TABLET, COATED ORAL DAILY
Qty: 90 TABLET | Refills: 3 | Status: SHIPPED | OUTPATIENT
Start: 2024-10-31 | End: 2025-10-31

## 2024-10-31 RX ORDER — METOPROLOL SUCCINATE 50 MG/1
50 TABLET, EXTENDED RELEASE ORAL DAILY
Qty: 90 TABLET | Refills: 3 | Status: SHIPPED | OUTPATIENT
Start: 2024-10-31 | End: 2025-10-31

## 2024-11-01 ENCOUNTER — HOSPITAL ENCOUNTER (OUTPATIENT)
Dept: CARDIOLOGY | Facility: HOSPITAL | Age: 69
Discharge: HOME | End: 2024-11-01
Payer: MEDICARE

## 2024-11-01 ENCOUNTER — HOSPITAL ENCOUNTER (OUTPATIENT)
Dept: RADIOLOGY | Facility: HOSPITAL | Age: 69
Discharge: HOME | End: 2024-11-01
Payer: MEDICARE

## 2024-11-01 DIAGNOSIS — I48.19 PERSISTENT ATRIAL FIBRILLATION (MULTI): ICD-10-CM

## 2024-11-01 DIAGNOSIS — I47.29 NSVT (NONSUSTAINED VENTRICULAR TACHYCARDIA) (MULTI): ICD-10-CM

## 2024-11-01 DIAGNOSIS — R94.31 ABNORMAL EKG: ICD-10-CM

## 2024-11-01 DIAGNOSIS — R01.1 MURMUR, CARDIAC: ICD-10-CM

## 2024-11-01 DIAGNOSIS — I48.20 CHRONIC ATRIAL FIBRILLATION, UNSPECIFIED (MULTI): ICD-10-CM

## 2024-11-01 DIAGNOSIS — R42 DIZZINESS AND GIDDINESS: ICD-10-CM

## 2024-11-01 DIAGNOSIS — I47.10 SUPRAVENTRICULAR TACHYCARDIA (CMS-HCC): ICD-10-CM

## 2024-11-01 DIAGNOSIS — I10 PRIMARY HYPERTENSION: ICD-10-CM

## 2024-11-01 DIAGNOSIS — I25.10 CORONARY ARTERIOSCLEROSIS: ICD-10-CM

## 2024-11-01 DIAGNOSIS — R06.09 DOE (DYSPNEA ON EXERTION): ICD-10-CM

## 2024-11-01 PROCEDURE — 93018 CV STRESS TEST I&R ONLY: CPT | Performed by: INTERNAL MEDICINE

## 2024-11-01 PROCEDURE — 93016 CV STRESS TEST SUPVJ ONLY: CPT | Performed by: INTERNAL MEDICINE

## 2024-11-01 PROCEDURE — 93017 CV STRESS TEST TRACING ONLY: CPT

## 2024-11-01 PROCEDURE — A9500 TC99M SESTAMIBI: HCPCS | Performed by: STUDENT IN AN ORGANIZED HEALTH CARE EDUCATION/TRAINING PROGRAM

## 2024-11-01 PROCEDURE — 2500000004 HC RX 250 GENERAL PHARMACY W/ HCPCS (ALT 636 FOR OP/ED): Performed by: STUDENT IN AN ORGANIZED HEALTH CARE EDUCATION/TRAINING PROGRAM

## 2024-11-01 PROCEDURE — 3430000001 HC RX 343 DIAGNOSTIC RADIOPHARMACEUTICALS: Performed by: STUDENT IN AN ORGANIZED HEALTH CARE EDUCATION/TRAINING PROGRAM

## 2024-11-01 PROCEDURE — 78452 HT MUSCLE IMAGE SPECT MULT: CPT

## 2024-11-01 PROCEDURE — 93306 TTE W/DOPPLER COMPLETE: CPT

## 2024-11-01 RX ORDER — TETRAKIS(2-METHOXYISOBUTYLISOCYANIDE)COPPER(I) TETRAFLUOROBORATE 1 MG/ML
10.2 INJECTION, POWDER, LYOPHILIZED, FOR SOLUTION INTRAVENOUS
Status: COMPLETED | OUTPATIENT
Start: 2024-11-01 | End: 2024-11-01

## 2024-11-01 RX ORDER — REGADENOSON 0.08 MG/ML
0.4 INJECTION, SOLUTION INTRAVENOUS ONCE
Status: COMPLETED | OUTPATIENT
Start: 2024-11-01 | End: 2024-11-01

## 2024-11-01 RX ORDER — TETRAKIS(2-METHOXYISOBUTYLISOCYANIDE)COPPER(I) TETRAFLUOROBORATE 1 MG/ML
30.2 INJECTION, POWDER, LYOPHILIZED, FOR SOLUTION INTRAVENOUS
Status: COMPLETED | OUTPATIENT
Start: 2024-11-01 | End: 2024-11-01

## 2024-11-04 ENCOUNTER — TELEMEDICINE (OUTPATIENT)
Dept: CARDIOLOGY | Facility: HOSPITAL | Age: 69
End: 2024-11-04
Payer: MEDICARE

## 2024-11-04 DIAGNOSIS — G47.30 SLEEP APNEA, UNSPECIFIED TYPE: ICD-10-CM

## 2024-11-04 DIAGNOSIS — I42.9 CARDIOMYOPATHY, UNSPECIFIED TYPE (MULTI): ICD-10-CM

## 2024-11-04 DIAGNOSIS — I25.10 CORONARY ARTERY CALCIFICATION: ICD-10-CM

## 2024-11-04 DIAGNOSIS — I10 PRIMARY HYPERTENSION: Primary | ICD-10-CM

## 2024-11-04 DIAGNOSIS — I48.0 PAROXYSMAL A-FIB (MULTI): ICD-10-CM

## 2024-11-04 PROBLEM — I48.19 PERSISTENT ATRIAL FIBRILLATION (MULTI): Status: RESOLVED | Noted: 2024-11-04 | Resolved: 2024-11-04

## 2024-11-04 PROBLEM — I48.19 PERSISTENT ATRIAL FIBRILLATION (MULTI): Status: ACTIVE | Noted: 2024-11-04

## 2024-11-04 LAB
AORTIC VALVE MEAN GRADIENT: 2 MMHG
AORTIC VALVE PEAK VELOCITY: 1 M/S
AV PEAK GRADIENT: 4 MMHG
AVA (PEAK VEL): 3.75 CM2
AVA (VTI): 4.1 CM2
EJECTION FRACTION APICAL 4 CHAMBER: 48.6
EJECTION FRACTION: 48 %
LEFT ATRIUM VOLUME AREA LENGTH INDEX BSA: 29.7 ML/M2
LEFT VENTRICLE INTERNAL DIMENSION DIASTOLE: 4.46 CM (ref 3.5–6)
LEFT VENTRICULAR OUTFLOW TRACT DIAMETER: 2 CM
RIGHT VENTRICLE FREE WALL PEAK S': 8.49 CM/S
TRICUSPID ANNULAR PLANE SYSTOLIC EXCURSION: 1.7 CM

## 2024-11-04 PROCEDURE — 99215 OFFICE O/P EST HI 40 MIN: CPT | Performed by: STUDENT IN AN ORGANIZED HEALTH CARE EDUCATION/TRAINING PROGRAM

## 2024-11-04 PROCEDURE — G2211 COMPLEX E/M VISIT ADD ON: HCPCS | Performed by: STUDENT IN AN ORGANIZED HEALTH CARE EDUCATION/TRAINING PROGRAM

## 2024-11-04 PROCEDURE — 1123F ACP DISCUSS/DSCN MKR DOCD: CPT | Performed by: STUDENT IN AN ORGANIZED HEALTH CARE EDUCATION/TRAINING PROGRAM

## 2024-11-04 NOTE — PROGRESS NOTES
Primary Care Physician: Iraj Merritt MD   Date of Visit: 11/04/2024  1:30 PM EST  Type of Visit: follow up       Chief Complaint:  No chief complaint on file.       HPI  Van Latham 69 y.o. male here today to fu on test results    Phone visit     He gets intermittent afib symptoms, he is not in afib over the last few days, but he was in afib last friday when he did the tests    Weight has been stable    Still exercising  Gets intermittent sob and fatigue with exertion  Does 35 mins on stationary bike with intermittent out of proportion tachycardia   No chest pain or angina  No le edema  No PND or orthopnea  BP at home 130s/80s range     No bleeding issues  No contrast allergy  Started eliquis ~2-3 weeks ago       Review of Systems   Review of Systems   12 points review of systems are negative expect for the above    Social History:  Social History     Socioeconomic History    Marital status:      Spouse name: Not on file    Number of children: Not on file    Years of education: Not on file    Highest education level: Not on file   Occupational History    Not on file   Tobacco Use    Smoking status: Never    Smokeless tobacco: Never   Substance and Sexual Activity    Alcohol use: Not Currently     Alcohol/week: 2.0 standard drinks of alcohol     Types: 2 Glasses of wine per week     Comment: rare    Drug use: Never    Sexual activity: Yes     Partners: Female     Birth control/protection: None   Other Topics Concern    Not on file   Social History Narrative    Not on file     Social Drivers of Health     Financial Resource Strain: Not on file   Food Insecurity: Not on file   Transportation Needs: Not on file   Physical Activity: Not on file   Stress: Not on file   Social Connections: Not on file   Intimate Partner Violence: Not on file   Housing Stability: Not on file        Past Medical History:  Past Medical History:   Diagnosis Date    Acute bronchitis 12/12/2023    Acute laryngitis 12/12/2023     "Acute lower UTI 12/12/2023    Acute pharyngitis 12/12/2023    Arthritis 1/15/2020    Body mass index (BMI) 33.0-33.9, adult 05/17/2021    Body mass index (BMI) of 33.0 to 33.9 in adult    Body mass index (BMI) 33.0-33.9, adult 11/11/2019    BMI 33.0-33.9,adult    Chest pressure 12/12/2023    Daytime somnolence 12/12/2023    Fatigue 12/12/2023    GERD (gastroesophageal reflux disease) 6/1/2013    Hypercholesteremia 12/12/2023    Hypertension 6/1/2016    Occult blood in stools 12/12/2023    Pain of left heel 12/12/2023    Pure hypercholesterolemia, unspecified 12/13/2022    Hypercholesteremia    Pure hyperglyceridemia     Hypertriglyceridemia    Snoring 12/12/2023    Varicella 12/15/1961       Past Surgical History:  Past Surgical History:   Procedure Laterality Date    CIRCUMCISION, PRIMARY  1956    FRACTURE SURGERY  8/15/1963    PROSTATE SURGERY  08/2022    VASECTOMY  7/1/1986       Family History:  Family History   Problem Relation Name Age of Onset    Other (passed of heart attack) Father Prashanth     Hypertension Father Prashanth     Cancer Sister Sade     Miscarriages / Stillbirths Mother Saritha     Hypertension Brother Jeffrey     Intellectual Disability Brother Jonathan         Objective:       12/13/2022     7:48 AM 6/13/2023     7:42 AM 12/12/2023    11:16 AM 4/12/2024     9:15 AM 8/20/2024    11:46 AM 10/9/2024     3:28 PM 10/23/2024     8:35 AM   Vitals   Systolic 149 146 132 148 143 142 118   Diastolic 88 90 82 89 78 68 78   Heart Rate 69 61 63 56 50 86 52   Resp 18 18  18 18     Height (in) 1.778 m (5' 10\") 1.778 m (5' 10\") 1.778 m (5' 10\") 1.778 m (5' 10\") 1.778 m (5' 10\")  1.778 m (5' 10\")   Weight (lb) 240.38 228 226.8 233.2 225 229.5 223   BMI 34.49 kg/m2 32.71 kg/m2 32.54 kg/m2 33.46 kg/m2 32.28 kg/m2 32.93 kg/m2 32 kg/m2   BSA (m2) 2.32 m2 2.26 m2 2.26 m2 2.29 m2 2.24 m2 2.27 m2 2.23 m2   Visit Report  Report Report Report Report Report Report      Not in distress.   alert and oriented to person, place " and time.     Allergies:  No Known Allergies    Medications:  Current Outpatient Medications   Medication Instructions    amLODIPine (NORVASC) 2.5 mg, oral, Daily    apixaban (ELIQUIS) 5 mg, oral, 2 times daily    fenofibrate (TRICOR) 145 mg, oral, Daily    lisinopril 20 mg, oral, Daily    meloxicam (MOBIC) 15 mg, oral, Daily PRN    metoprolol succinate XL (TOPROL-XL) 50 mg, oral, Daily, Do not crush or chew.    omeprazole (PRILOSEC) 20 mg, oral, Daily    rosuvastatin (CRESTOR) 20 mg, oral, Daily    sildenafil (VIAGRA) 100 mg, oral, Daily PRN    tadalafil (CIALIS) 10 mg, oral, Daily PRN    tadalafil (CIALIS) 5 mg, oral, Daily        Labs and Imaging:     Lab Results   Component Value Date    WBC 6.2 12/13/2023    HGB 13.7 12/13/2023    HCT 43.2 12/13/2023     12/13/2023    CHOL 133 12/13/2023    TRIG 143 12/13/2023    HDL 28.0 12/13/2023    ALT 22 12/13/2023    AST 26 12/13/2023     12/13/2023    K 4.3 12/13/2023     12/13/2023    CREATININE 1.12 12/13/2023    BUN 20 12/13/2023    CO2 28 12/13/2023    TSH 2.09 12/13/2023    PSA 5.6 (H) 12/20/2021    INR 1.1 03/07/2022    HGBA1C 5.8 (A) 11/23/2021         Echocardiogram: No results found for this or any previous visit from the past 1825 days.    Stress Testing: No results found for this or any previous visit from the past 1825 days.    Cardiac Catheterization: No results found for this or any previous visit from the past 1825 days.    Cardiac Scoring:   CT cardiac scoring wo IV contrast 06/27/2023    Narrative  Interpreted By:  HELADIO WHEELER MD  MRN: [id number]  Patient Name: DARIAN HAMM    STUDY:  CT CARDIAC SCORING;  6/27/2023 7:42 am    INDICATION:  HTN, heart risk scoring.    COMPARISON:  None.    ACCESSION NUMBER(S):  [id number]    ORDERING CLINICIAN:  JP RUBY    TECHNIQUE:  Using prospective ECG gating, CT scan of the coronary arteries was  performed without intravenous contrast. Coronary calcium scoring  was  performed according to  "the method of Agatston.    FINDINGS:  The score and distribution of calcium in the coronary arteries is as  follows:    LM 0,  LAD 68.93,  LCx 28.06,  RCA 83.91,    Total 180.9    The visualized mid/lower ascending thoracic aorta measures 3.3 cm in  diameter. The heart is normal in size. No pericardial effusion is  present.    No gross mediastinal or hilar lymphadenopathy or mass is identified.  The visualized segments of the lungs are clear aside from faint  \"ground-glass\" changes in the basal left lower lobe and medial right  lower lobe that may be subsegmental atelectasis or chronic but are  not entirely specific and should be correlated clinically.    The visualized subdiaphragmatic structures appear normal.    Impression  1. Coronary artery calcium score of 180.9*.    *Coronary artery calcium scoring may be helpful in predicting the  risk for future coronary heart disease events.  According to the  American College [employer] Foundation Clinical Expert Consensus  Task Force, such testing provides important prognostic information in  patients with more than one coronary heart disease risk factor. The  coronary artery calcium score correlates with the annual risk of a  non-fatal myocardial infarction or coronary heart disease death.    Coronary artery score            Annual Risk    0-99                             0.4%  100-399                        1.3%  >400                            2.4%    These three \"breakpoints\" correspond to lower, intermediate and high  risk states for future coronary events.  Such information should be  used, along with appropriate clinical judgment, to make decisions  regarding the intensity of risk factor management strategies to treat  blood lipids and to modify other non-lipid coronary risk factors.    Reference: [place] P et al. Circulation.  2007; 115:402-426    AAA : No results found for this or any previous visit from the past 1825 days.    OTHER: No results found for " this or any previous visit from the past 1825 days.      The 10-year ASCVD risk score (Maykel AVITIA, et al., 2019) is: 16.8%    Values used to calculate the score:      Age: 69 years      Sex: Male      Is Non- : No      Diabetic: No      Tobacco smoker: No      Systolic Blood Pressure: 118 mmHg      Is BP treated: Yes      HDL Cholesterol: 28 mg/dL      Total Cholesterol: 133 mg/dL     Assessment/Plan:   No diagnosis found.   Van Latham 69 y.o. male with hx of MIREYA on CPAP, HTN, HLD, GERD referred for CT Ca score 180. ECHO 12/15/2023 LVSF 60-65%, aortic valve sclerosis.      Zio with afib 25% and 2 short episodes of NSVT    EKG with afib + rvr with       Echo with LVEF 45% with concerning mid-apical hypertrophy, can not exclude HCM   Nuc negative, LVEF 36%  Afib is still parox he monitors it at home. Not in afib today according to him      Plan  Continue Fenofibrate   Continue Crestor   Continue Eliquis   Continue Torpol XL   Switch lisinopril to entresto 72 hr wash out period   Will do LHC (hold eliquis 48 hrs prior to cath)  Will do CMR r/o apical Hypertrophic cardiomyopathy   Follow up with EP regarding afib  Repeat labs/full panel  Clinical pharmacy consult     Time Spent: I spent  minutes reviewing medical testing, obtaining medical history and counselling and educating on diagnosis and documenting clinical encounter.      High complex      ____________________________________________________________  Piotr Sood MD   of Medicine  Division of Cardiovascular Medicine   [facility] [place]  [facility]

## 2024-11-11 ENCOUNTER — LAB (OUTPATIENT)
Dept: LAB | Facility: LAB | Age: 69
End: 2024-11-11
Payer: MEDICARE

## 2024-11-11 DIAGNOSIS — G47.30 SLEEP APNEA, UNSPECIFIED TYPE: ICD-10-CM

## 2024-11-11 DIAGNOSIS — I25.10 CORONARY ARTERY CALCIFICATION: ICD-10-CM

## 2024-11-11 DIAGNOSIS — I42.9 CARDIOMYOPATHY, UNSPECIFIED TYPE (MULTI): ICD-10-CM

## 2024-11-11 DIAGNOSIS — I48.0 PAROXYSMAL A-FIB (MULTI): ICD-10-CM

## 2024-11-11 DIAGNOSIS — I10 PRIMARY HYPERTENSION: ICD-10-CM

## 2024-11-11 LAB
ALBUMIN SERPL BCP-MCNC: 4.1 G/DL (ref 3.4–5)
ALP SERPL-CCNC: 40 U/L (ref 33–136)
ALT SERPL W P-5'-P-CCNC: 26 U/L (ref 10–52)
ANION GAP SERPL CALC-SCNC: 11 MMOL/L (ref 10–20)
AST SERPL W P-5'-P-CCNC: 28 U/L (ref 9–39)
BASOPHILS # BLD AUTO: 0.07 X10*3/UL (ref 0–0.1)
BASOPHILS NFR BLD AUTO: 1.1 %
BILIRUB SERPL-MCNC: 0.5 MG/DL (ref 0–1.2)
BUN SERPL-MCNC: 19 MG/DL (ref 6–23)
CALCIUM SERPL-MCNC: 10.3 MG/DL (ref 8.6–10.3)
CHLORIDE SERPL-SCNC: 109 MMOL/L (ref 98–107)
CHOLEST SERPL-MCNC: 139 MG/DL (ref 0–199)
CHOLESTEROL/HDL RATIO: 5.2
CO2 SERPL-SCNC: 23 MMOL/L (ref 21–32)
CREAT SERPL-MCNC: 1.06 MG/DL (ref 0.5–1.3)
EGFRCR SERPLBLD CKD-EPI 2021: 76 ML/MIN/1.73M*2
EOSINOPHIL # BLD AUTO: 0.44 X10*3/UL (ref 0–0.7)
EOSINOPHIL NFR BLD AUTO: 7 %
ERYTHROCYTE [DISTWIDTH] IN BLOOD BY AUTOMATED COUNT: 13.7 % (ref 11.5–14.5)
GLUCOSE SERPL-MCNC: 105 MG/DL (ref 74–99)
HCT VFR BLD AUTO: 45.3 % (ref 41–52)
HDLC SERPL-MCNC: 26.5 MG/DL
HGB BLD-MCNC: 14.8 G/DL (ref 13.5–17.5)
IMM GRANULOCYTES # BLD AUTO: 0.02 X10*3/UL (ref 0–0.7)
IMM GRANULOCYTES NFR BLD AUTO: 0.3 % (ref 0–0.9)
LDLC SERPL CALC-MCNC: 84 MG/DL
LYMPHOCYTES # BLD AUTO: 1.6 X10*3/UL (ref 1.2–4.8)
LYMPHOCYTES NFR BLD AUTO: 25.4 %
MCH RBC QN AUTO: 29.2 PG (ref 26–34)
MCHC RBC AUTO-ENTMCNC: 32.7 G/DL (ref 32–36)
MCV RBC AUTO: 89 FL (ref 80–100)
MONOCYTES # BLD AUTO: 0.59 X10*3/UL (ref 0.1–1)
MONOCYTES NFR BLD AUTO: 9.4 %
NEUTROPHILS # BLD AUTO: 3.59 X10*3/UL (ref 1.2–7.7)
NEUTROPHILS NFR BLD AUTO: 56.8 %
NON HDL CHOLESTEROL: 113 MG/DL (ref 0–149)
NRBC BLD-RTO: 0 /100 WBCS (ref 0–0)
PLATELET # BLD AUTO: 163 X10*3/UL (ref 150–450)
POTASSIUM SERPL-SCNC: 4.2 MMOL/L (ref 3.5–5.3)
PROT SERPL-MCNC: 7.3 G/DL (ref 6.4–8.2)
RBC # BLD AUTO: 5.07 X10*6/UL (ref 4.5–5.9)
SODIUM SERPL-SCNC: 139 MMOL/L (ref 136–145)
TRIGL SERPL-MCNC: 142 MG/DL (ref 0–149)
TSH SERPL-ACNC: 1.96 MIU/L (ref 0.44–3.98)
VLDL: 28 MG/DL (ref 0–40)
WBC # BLD AUTO: 6.3 X10*3/UL (ref 4.4–11.3)

## 2024-11-11 PROCEDURE — 36415 COLL VENOUS BLD VENIPUNCTURE: CPT

## 2024-11-11 NOTE — PROGRESS NOTES
Pharmacist Clinic: Cardiology Management    Dawna Latham is a 69 y.o. male was referred to Clinical Pharmacy Team for HF/entresto Anticoag/eliquis management.     Referring Provider: Piotr Sood MD    THIS IS A NEW PATIENT APPOINTMENT. PATIENT WILL BE ESTABLISHING CARE WITH CLINICAL PHARMACY.    Appointment was completed by dawna who was reached at [phone].        Allergies Reviewed? No    No Known Allergies    Past Medical History:   Diagnosis Date    Acute bronchitis 12/12/2023    Acute laryngitis 12/12/2023    Acute lower UTI 12/12/2023    Acute pharyngitis 12/12/2023    Arthritis 1/15/2020    Body mass index (BMI) 33.0-33.9, adult 05/17/2021    Body mass index (BMI) of 33.0 to 33.9 in adult    Body mass index (BMI) 33.0-33.9, adult 11/11/2019    BMI 33.0-33.9,adult    Chest pressure 12/12/2023    Daytime somnolence 12/12/2023    Fatigue 12/12/2023    GERD (gastroesophageal reflux disease) 6/1/2013    Hypercholesteremia 12/12/2023    Hypertension 6/1/2016    Occult blood in stools 12/12/2023    Pain of left heel 12/12/2023    Pure hypercholesterolemia, unspecified 12/13/2022    Hypercholesteremia    Pure hyperglyceridemia     Hypertriglyceridemia    Snoring 12/12/2023    Varicella 12/15/1960       Current Outpatient Medications on File Prior to Visit   Medication Sig Dispense Refill    amLODIPine (Norvasc) 2.5 mg tablet TAKE 1 TABLET ONCE DAILY 90 tablet 1    apixaban (Eliquis) 5 mg tablet Take 1 tablet (5 mg) by mouth 2 times a day. 180 tablet 3    fenofibrate (Tricor) 145 mg tablet TAKE ONE TABLET BY MOUTH DAILY 90 tablet 0    meloxicam (Mobic) 15 mg tablet Take 1 tablet (15 mg) by mouth once daily as needed for mild pain (1 - 3). 90 tablet 0    metoprolol succinate XL (Toprol-XL) 50 mg 24 hr tablet Take 1 tablet (50 mg) by mouth once daily. Do not crush or chew. 90 tablet 3    omeprazole (PriLOSEC) 20 mg DR capsule TAKE 1 CAPSULE DAILY 90 capsule 0    rosuvastatin (Crestor) 20 mg tablet Take 1 tablet  "(20 mg) by mouth once daily. 90 tablet 3    sacubitriL-valsartan (Entresto) 24-26 mg tablet Take 1 tablet by mouth 2 times a day. 180 tablet 3    sildenafil (Viagra) 100 mg tablet Take 1 tablet (100 mg) by mouth once daily as needed for erectile dysfunction. 10 tablet 1    tadalafil (Cialis) 10 mg tablet Take 1 tablet (10 mg) by mouth once daily as needed for erectile dysfunction. 10 tablet 11    tadalafil (Cialis) 5 mg tablet Take 1 tablet (5 mg) by mouth once daily. 30 tablet 11     No current facility-administered medications on file prior to visit.         RELEVANT LAB RESULTS:  Lab Results   Component Value Date    BILITOT 0.5 11/11/2024    CALCIUM 10.3 11/11/2024    CO2 23 11/11/2024     (H) 11/11/2024    CREATININE 1.06 11/11/2024    GLUCOSE 105 (H) 11/11/2024    ALKPHOS 40 11/11/2024    K 4.2 11/11/2024    PROT 7.3 11/11/2024     11/11/2024    AST 28 11/11/2024    ALT 26 11/11/2024    BUN 19 11/11/2024    ANIONGAP 11 11/11/2024    ALBUMIN 4.1 11/11/2024    GFRMALE 67 12/13/2022     Lab Results   Component Value Date    TRIG 142 11/11/2024    CHOL 139 11/11/2024    LDLCALC 84 11/11/2024    HDL 26.5 11/11/2024     No results found for: \"BMCBC\", \"CBCDIF\"     PHARMACEUTICAL ASSESSMENT:    MEDICATION RECONCILIATION    Home Pharmacy Reviewed? Yes, describe: [facility]      Medication Documentation Review Audit       Reviewed by Hanna Miller RN (Registered Nurse) on 10/23/24 at 0833      Medication Order Taking? Sig Documenting Provider Last Dose Status   amLODIPine (Norvasc) 2.5 mg tablet [id number] Yes TAKE 1 TABLET ONCE DAILY Iraj Merritt MD Taking Active   apixaban (Eliquis) 5 mg tablet [id number] Yes Take 1 tablet (5 mg) by mouth 2 times a day. Piotr Sood MD  Active   fenofibrate (Tricor) 145 mg tablet [id number] Yes Take 1 tablet (145 mg) by mouth once daily. Iraj Merritt MD Taking Active   lisinopril 20 mg tablet [id number] Yes Take 1 tablet (20 " mg) by mouth once daily. Iraj Merritt MD Taking Active   meloxicam (Mobic) 15 mg tablet [id number] Yes Take 1 tablet (15 mg) by mouth once daily as needed for mild pain (1 - 3). Iraj Merritt MD Taking Active   metoprolol succinate XL (Toprol-XL) 50 mg 24 hr tablet [id number] Yes Take 1 tablet (50 mg) by mouth once daily. Do not crush or chew. Piotr Pate MD  Active   omeprazole (PriLOSEC) 20 mg DR capsule [id number] Yes TAKE 1 CAPSULE DAILY Iraj Merritt MD Taking Active   rosuvastatin (Crestor) 20 mg tablet [id number] Yes Take 1 tablet (20 mg) by mouth once daily. Piotr Pate MD  Active   sildenafil (Viagra) 100 mg tablet [id number] No Take 1 tablet (100 mg) by mouth once daily as needed for erectile dysfunction. Iraj Merritt MD Taking  10/09/24 3799   tadalafil (Cialis) 10 mg tablet [id number] Yes Take 1 tablet (10 mg) by mouth once daily as needed for erectile dysfunction. Jonah Whyte MD Taking Active   tadalafil (Cialis) 5 mg tablet [id number] Yes Take 1 tablet (5 mg) by mouth once daily. Jonah Whyte MD Taking Active                    DISEASE MANAGEMENT ASSESSMENT:     CHF ASSESSMENT     Symptom/Staging:  -Most recent ejection fraction: 48%    Results for orders placed during the hospital encounter of 24    Transthoracic echo (TTE) complete    [facility], [address] [place] [address]  Tel [phone] and Fax [phone]    TRANSTHORACIC ECHOCARDIOGRAM REPORT      Patient Name:       DARIAN Nunez Physician:    Gita Pate MD  Study Date:         2024           Ordering Provider:    Gita PATE  MRN/PID:            [id number]            Fellow:  Accession#:         [id number]        Nurse:                Aixa Ramos RN  Date of Birth/Age:  1955 / 69 years Sonographer:          Mandy Akins RDCS  Gender assigned at                     Additional Staff:  Birth:  Height:              177.80 cm           Admit Date:           11/1/2024  Weight:             101.15 kg           Admission Status:     Outpatient  BSA / BMI:          2.19 m2 / 32.00     Encounter#:           [id number]  kg/m2  Blood Pressure:     134/92 mmHg         Department Location:  [facility] Non  Invasive    Study Type:    TRANSTHORACIC ECHO (TTE) COMPLETE  Diagnosis/ICD: Cardiac murmur, unspecified-R01.1; Other forms of dyspnea-R06.09;  Ventricular tachycardia, other-I47.29  Indication:    Heart Murmur, NSVT, Dyspnea on Exertion  CPT Code:      Echo Complete w Full Doppler-[id number]    Patient History:  Pertinent History: Murmur, HTN, A-Fib, Dyspnea and Hyperlipidemia. dizziness,  NSVT, abnormal EKG.    Study Detail: The following Echo studies were performed: 2D, M-Mode, Doppler and  color flow. Technically challenging study due to prominent lung  artifact. Definity used as a contrast agent for endocardial border  definition. Total contrast used for this procedure was 2.0 mL via  IV push. The patient was awake.      PHYSICIAN INTERPRETATION:  Left Ventricle: The left ventricular systolic function is mildly decreased, with a Masters's biplane calculated ejection fraction of 48%. The patient is in atrial fibrillation which may influence the estimate of left ventricular function and transvalvular flows. There is mildly increased left ventricular hypertrophy. There is global hypokinesis of the left ventricle with minor regional variations. The left ventricular cavity size is mildly dilated. There is left ventricular concentric remodeling. Left ventricular diastolic filling cannot be determined, due to E/A wave fusion.  Left Atrium: The left atrium is normal in size.  Right Ventricle: The right ventricle is normal in size. There is normal right ventricular global systolic function.  Right Atrium: The right atrium is normal in size.  Aortic Valve: The aortic valve is trileaflet. The aortic valve dimensionless index is 1.30. There is no  evidence of aortic valve regurgitation. The peak instantaneous gradient of the aortic valve is 4 mmHg. The mean gradient of the mitral valve is 2 mmHg.  Mitral Valve: The mitral valve is normal in structure. There is trace mitral valve regurgitation.  Tricuspid Valve: The tricuspid valve is structurally normal. There is trace to mild tricuspid regurgitation. The right ventricular systolic pressure is unable to be estimated.  Pulmonic Valve: The pulmonic valve is not well visualized. There is physiologic pulmonic valve regurgitation.  Pericardium: Trivial to small pericardial effusion.  Aorta: The aortic root is normal. The aortic root is at the upper limits of normal size.  Systemic Veins: The inferior vena cava appears normal in size, with IVC inspiratory collapse greater than 50%.  In comparison to the previous echocardiogram(s): Compared with study dated 12/15/2023, LVEF has dropped, patient is in afib.      CONCLUSIONS:  1. The left ventricular systolic function is mildly decreased, with a Masters's biplane calculated ejection fraction of 48%.  2. There is global hypokinesis of the left ventricle with minor regional variations.  3. Left ventricular diastolic filling cannot be determined, due to E/A wave fusion.  4. Left ventricular cavity size is mildly dilated.  5. There is normal right ventricular global systolic function.  6. Abnormal thickeness of the mid-apical LV wall, can not exclude Hypertrophic cardiomyopathy. No LV aneurysm. Cardiac MRI is recommended for further assessement.  7. The patient is in atrial fibrillation which may influence the estimate of left ventricular function and transvalvular flows.    QUANTITATIVE DATA SUMMARY:    2D MEASUREMENTS:          Normal Ranges:  Ao Root d:       3.20 cm  (2.0-3.7cm)  IVSd:            1.28 cm  (0.6-1.1cm)  LVPWd:           1.17 cm  (0.6-1.1cm)  LVIDd:           4.46 cm  (3.9-5.9cm)  LVIDs:           3.10 cm  LV Mass Index:   92 g/m2  LVEDV Index:     50  ml/m2  LV % FS          30.5 %      LA VOLUME:                    Normal Ranges:  LA Vol A4C:        60.4 ml    (22+/-6mL/m2)  LA Vol A2C:        65.2 ml  LA Vol BP:         64.9 ml  LA Vol Index A4C:  27.6 ml/m2  LA Vol Index A2C:  29.8 ml/m2  LA Vol Index BP:   29.7 ml/m2  LA Area A4C:       20.5 cm2  LA Area A2C:       20.6 cm2  LA Major Axis A4C: 5.9 cm  LA Major Axis A2C: 5.5 cm  LA Volume Index:   27.6 ml/m2  LA Vol A4C:        55.8 ml  LA Vol A2C:        61.5 ml  LA Vol Index BSA:  26.8 ml/m2      RA VOLUME BY A/L METHOD:          Normal Ranges:  RA Area A4C:             15.0 cm2      M-MODE MEASUREMENTS:         Normal Ranges:  Ao Root:             3.80 cm (2.0-3.7cm)  AoV Exc:             2.30 cm (1.5-2.5cm)  LAs:                 2.90 cm (2.7-4.0cm)      AORTA MEASUREMENTS:         Normal Ranges:  AoV Exc:            2.30 cm (1.5-2.5cm)  Ao Sinus, d:        3.20 cm (2.1-3.5cm)  Asc Ao, d:          3.80 cm (2.1-3.4cm)      LV SYSTOLIC FUNCTION BY 2D PLANIMETRY (MOD):  Normal Ranges:  EF-A4C View:    49 % (>=55%)  EF-A2C View:    48 %  EF-Biplane:     48 %  LV EF Reported: 48 %      LV DIASTOLIC FUNCTION:            Normal Ranges:  MV Peak E:             0.74 m/s   (0.7-1.2 m/s)  MV e'                  0.114 m/s  (>8.0)  MV lateral e'          0.14 m/s  MV medial e'           0.09 m/s  E/e' Ratio:            6.49       (<8.0)  PulmV Sys Greg:         35.30 cm/s  PulmV Gomez Greg:        46.80 cm/s  PulmV S/D Greg:         0.80      MITRAL VALVE:          Normal Ranges:  MV DT:        232 msec (150-240msec)      AORTIC VALVE:                     Normal Ranges:  AoV Vmax:                1.00 m/s (<=1.7m/s)  AoV Peak P.0 mmHg (<20mmHg)  AoV Mean P.0 mmHg (1.7-11.5mmHg)  LVOT Max Greg:            1.19 m/s (<=1.1m/s)  AoV VTI:                 16.10 cm (18-25cm)  LVOT VTI:                21.00 cm  LVOT Diameter:           2.00 cm  (1.8-2.4cm)  AoV Area, VTI:           4.10 cm2  (2.5-5.5cm2)  AoV Area,Vmax:           3.75 cm2 (2.5-4.5cm2)  AoV Dimensionless Index: 1.30      RIGHT VENTRICLE:  RV Basal 3.14 cm  RV Mid   2.09 cm  RV Major 7.2 cm  TAPSE:   17.3 mm  RV s'    0.08 m/s      TRICUSPID VALVE/RVSP:         Normal Ranges:  IVC Diam:             1.41 cm      PULMONIC VALVE:          Normal Ranges:  PV Max Greg:     0.8 m/s  (0.6-0.9m/s)  PV Max P.7 mmHg      Pulmonary Veins:  PulmV Wild Greg: 46.80 cm/s  PulmV S/D Greg:  0.80  PulmV Sys Greg:  35.30 cm/s      [id number] Piotr Sood MD  Electronically signed on 2024 at 1:54:01 PM        ** Final **      Guideline-Directed Medical Therapy:  -ARNI: Yes, describe: entresto  bid   -If no, then ACEi/ARB?: No  -Beta Blocker: Yes, describe: metoprolol   -MRA: No  -SGLT2i: No    Secondary Prevention:  -The 10-year ASCVD risk score (Maykel DK, et al., 2019) is: 17.7%    Values used to calculate the score:      Age: 69 years      Sex: Male      Is Non- : No      Diabetic: No      Tobacco smoker: No      Systolic Blood Pressure: 118 mmHg      Is BP treated: Yes      HDL Cholesterol: 26.5 mg/dL      Total Cholesterol: 139 mg/dL   -Aspirin 81mg? no  -Statin?: Yes, describe: rosuvastatin 20mg qd        Affordability: cost issue with entresto  Adherence/Compliance: no issues noted  Adverse Effects: none reported    Monitoring Weights at Home: Yes  Home Weight Recordings: 223lb     Past In Office Weight Readings:   Wt Readings from Last 6 Encounters:   10/23/24 101 kg (223 lb)   10/09/24 104 kg (229 lb 8 oz)   24 102 kg (225 lb)   24 106 kg (233 lb 3.2 oz)   23 103 kg (226 lb 12.8 oz)   23 103 kg (228 lb)       Monitoring Blood Pressure at Home: No  Home BP Recordings: 130s sys at home wild 80    Past In Office BP Readings:   BP Readings from Last 6 Encounters:   10/23/24 118/78   10/09/24 142/68   24 143/78   24 148/89   23 132/82   23 146/90       HEALTH  MANAGEMENT    Maintaining fluid restriction (<2 L/day): N/A  Edema/swelling: No  Shortness of breath: Yes no worse recently but has noticed some shortness of breath with activity  Trouble sleeping/lying down: No  Dry/hacking cough: No  Recent Hospitalizations: No    EDUCATION/COUNSELING:   - Counseled patient on MOA, expectations, duration of therapy, contraindications, administration, and monitoring parameters  - Counseled patient on lifestyle modifications that can decrease your risk of having complications (smoking cessation, losing weight, daily weights, vaccines)  - Counseled patient on fluid intake and weight management. Recommended to not consume more than 2 liters of fliuids per day. If they have gained more than 2-3 pounds within a 24 hours period (or 5 pounds in a week), contact their cardiologist  - Answered all patient questions and concerns     ANTICOAGULATION ASSESSMENT    The 10-year ASCVD risk score (Maykel AVITIA, et al., 2019) is: 17.7%    Values used to calculate the score:      Age: 69 years      Sex: Male      Is Non- : No      Diabetic: No      Tobacco smoker: No      Systolic Blood Pressure: 118 mmHg      Is BP treated: Yes      HDL Cholesterol: 26.5 mg/dL      Total Cholesterol: 139 mg/dL    DIAGNOSIS: prevention of nonvalvular atrial fibrilliation stroke and systemic embolism  - Patient is projected to be on anticoagulation indefinite  - [id number] Score: [3] (only included if diagnosis is atrial fibrillation)   Age: [<65 (0)] [65-74 (+1)] [> 75 (+2)]: 1  Sex: [Male/Female (+1)]: 0  CHF history: [No/Yes(+1)]: 1  Hypertension history: [No/Yes(+1)]: 1  Stroke/TIA/thromboembolism history: [No/Yes(+2)]: 0  Vascular disease history (prior MI, peripheral artery disease, aortic plaque): [No/Yes(+1)]: 0  Diabetes history: [No/Yes(+1)]: 0    CURRENT PHARMACOTHERAPY:    Eliquis 5mg bid    EDUCATION/COUNSELING:   - Counseled patient on MOA, expectations, duration of therapy,  contraindications, administration, and monitoring parameters  - Counseled patient of side effects that are indicative of bleeding such as dark tarry stool, unexplainable bruising, or vomiting up a coffee ground like substance    DISCUSSION/NOTES:   At this time above income for available assistance programs. Discussed medicare d plan selection and chosing a plan with best coverage for entresto and eliquis.    ASSESSMENT:    Assessment/Plan   Problem List Items Addressed This Visit    None        RECOMMENDATIONS/PLAN:    Patient reported had restarted the lisinopril after not picking up the entresto. Will get entresto through pharmacy and reminded again of 72hr lisinopril washout before starting.    Last Appnt with Referring Provider: 11/4  Next Appnt with Referring Provider: 12/18  Clinical Pharmacist follow up: prn    Type of Encounter: Josee Molina    Verbal consent to manage patient's drug therapy was obtained from the patient . They were informed they may decline to participate or withdraw from participation in pharmacy services at any time.    Continue all meds under the continuation of care with the referring provider and clinical pharmacy team.

## 2024-11-13 ENCOUNTER — APPOINTMENT (OUTPATIENT)
Dept: PHARMACY | Facility: HOSPITAL | Age: 69
End: 2024-11-13
Payer: MEDICARE

## 2024-11-13 DIAGNOSIS — G47.30 SLEEP APNEA, UNSPECIFIED TYPE: ICD-10-CM

## 2024-11-13 DIAGNOSIS — I42.9 CARDIOMYOPATHY, UNSPECIFIED TYPE (MULTI): ICD-10-CM

## 2024-11-13 DIAGNOSIS — I10 PRIMARY HYPERTENSION: ICD-10-CM

## 2024-11-13 DIAGNOSIS — I48.0 PAROXYSMAL A-FIB (MULTI): ICD-10-CM

## 2024-11-13 DIAGNOSIS — I25.10 CORONARY ARTERY CALCIFICATION: ICD-10-CM

## 2024-11-13 RX ORDER — GLUCOSAMINE/MSM/CHONDROIT SULF 500-166.6
TABLET ORAL
COMMUNITY

## 2024-11-13 RX ORDER — VIT C/E/ZN/COPPR/LUTEIN/ZEAXAN 250MG-90MG
50 CAPSULE ORAL DAILY
COMMUNITY

## 2024-11-26 ENCOUNTER — HOSPITAL ENCOUNTER (OUTPATIENT)
Dept: RADIOLOGY | Facility: CLINIC | Age: 69
Discharge: HOME | End: 2024-11-26
Payer: MEDICARE

## 2024-11-26 DIAGNOSIS — I42.9 CARDIOMYOPATHY, UNSPECIFIED TYPE (MULTI): ICD-10-CM

## 2024-11-26 DIAGNOSIS — I10 PRIMARY HYPERTENSION: ICD-10-CM

## 2024-11-26 DIAGNOSIS — I48.0 PAROXYSMAL A-FIB (MULTI): ICD-10-CM

## 2024-11-26 DIAGNOSIS — I25.10 CORONARY ARTERY CALCIFICATION: ICD-10-CM

## 2024-11-26 DIAGNOSIS — G47.30 SLEEP APNEA, UNSPECIFIED TYPE: ICD-10-CM

## 2024-11-26 PROCEDURE — 75561 CARDIAC MRI FOR MORPH W/DYE: CPT

## 2024-11-26 PROCEDURE — 75565 CARD MRI VELOC FLOW MAPPING: CPT | Performed by: INTERNAL MEDICINE

## 2024-11-26 PROCEDURE — 75565 CARD MRI VELOC FLOW MAPPING: CPT

## 2024-11-26 PROCEDURE — 75561 CARDIAC MRI FOR MORPH W/DYE: CPT | Performed by: INTERNAL MEDICINE

## 2024-11-26 PROCEDURE — 2550000001 HC RX 255 CONTRASTS: Performed by: STUDENT IN AN ORGANIZED HEALTH CARE EDUCATION/TRAINING PROGRAM

## 2024-11-26 PROCEDURE — A9575 INJ GADOTERATE MEGLUMI 0.1ML: HCPCS | Performed by: STUDENT IN AN ORGANIZED HEALTH CARE EDUCATION/TRAINING PROGRAM

## 2024-11-26 RX ORDER — GADOTERATE MEGLUMINE 376.9 MG/ML
40 INJECTION INTRAVENOUS
Status: COMPLETED | OUTPATIENT
Start: 2024-11-26 | End: 2024-11-26

## 2024-11-29 ENCOUNTER — HOSPITAL ENCOUNTER (OUTPATIENT)
Dept: RADIOLOGY | Facility: CLINIC | Age: 69
End: 2024-11-29
Payer: MEDICARE

## 2024-12-02 ENCOUNTER — OFFICE VISIT (OUTPATIENT)
Dept: CARDIOLOGY | Facility: HOSPITAL | Age: 69
End: 2024-12-02
Payer: MEDICARE

## 2024-12-02 ENCOUNTER — HOSPITAL ENCOUNTER (OUTPATIENT)
Facility: HOSPITAL | Age: 69
Setting detail: OUTPATIENT SURGERY
Discharge: HOME | End: 2024-12-02
Attending: INTERNAL MEDICINE | Admitting: INTERNAL MEDICINE
Payer: MEDICARE

## 2024-12-02 VITALS
OXYGEN SATURATION: 98 % | RESPIRATION RATE: 20 BRPM | HEART RATE: 89 BPM | SYSTOLIC BLOOD PRESSURE: 98 MMHG | DIASTOLIC BLOOD PRESSURE: 58 MMHG

## 2024-12-02 DIAGNOSIS — I25.10 CORONARY ARTERY CALCIFICATION: ICD-10-CM

## 2024-12-02 DIAGNOSIS — I25.10 CORONARY ARTERY DISEASE INVOLVING NATIVE CORONARY ARTERY OF NATIVE HEART WITHOUT ANGINA PECTORIS: ICD-10-CM

## 2024-12-02 DIAGNOSIS — I48.0 PAROXYSMAL A-FIB (MULTI): ICD-10-CM

## 2024-12-02 DIAGNOSIS — I42.2 CARDIOMYOPATHY, HYPERTROPHIC (MULTI): ICD-10-CM

## 2024-12-02 DIAGNOSIS — I42.9 CARDIOMYOPATHY, UNSPECIFIED TYPE (MULTI): ICD-10-CM

## 2024-12-02 DIAGNOSIS — E78.00 PURE HYPERCHOLESTEROLEMIA: ICD-10-CM

## 2024-12-02 DIAGNOSIS — I42.8 NICM (NONISCHEMIC CARDIOMYOPATHY) (MULTI): Primary | ICD-10-CM

## 2024-12-02 DIAGNOSIS — K21.9 GASTROESOPHAGEAL REFLUX DISEASE WITHOUT ESOPHAGITIS: ICD-10-CM

## 2024-12-02 DIAGNOSIS — I10 PRIMARY HYPERTENSION: ICD-10-CM

## 2024-12-02 DIAGNOSIS — I48.0 PAROXYSMAL ATRIAL FIBRILLATION (MULTI): ICD-10-CM

## 2024-12-02 PROCEDURE — C1887 CATHETER, GUIDING: HCPCS | Performed by: INTERNAL MEDICINE

## 2024-12-02 PROCEDURE — 93458 L HRT ARTERY/VENTRICLE ANGIO: CPT | Performed by: INTERNAL MEDICINE

## 2024-12-02 PROCEDURE — 2500000004 HC RX 250 GENERAL PHARMACY W/ HCPCS (ALT 636 FOR OP/ED): Performed by: INTERNAL MEDICINE

## 2024-12-02 PROCEDURE — C1760 CLOSURE DEV, VASC: HCPCS | Performed by: INTERNAL MEDICINE

## 2024-12-02 PROCEDURE — 1160F RVW MEDS BY RX/DR IN RCRD: CPT | Performed by: STUDENT IN AN ORGANIZED HEALTH CARE EDUCATION/TRAINING PROGRAM

## 2024-12-02 PROCEDURE — 99215 OFFICE O/P EST HI 40 MIN: CPT | Performed by: STUDENT IN AN ORGANIZED HEALTH CARE EDUCATION/TRAINING PROGRAM

## 2024-12-02 PROCEDURE — C1894 INTRO/SHEATH, NON-LASER: HCPCS | Performed by: INTERNAL MEDICINE

## 2024-12-02 PROCEDURE — 2720000007 HC OR 272 NO HCPCS: Performed by: INTERNAL MEDICINE

## 2024-12-02 PROCEDURE — 99215 OFFICE O/P EST HI 40 MIN: CPT | Mod: 25 | Performed by: STUDENT IN AN ORGANIZED HEALTH CARE EDUCATION/TRAINING PROGRAM

## 2024-12-02 PROCEDURE — 1123F ACP DISCUSS/DSCN MKR DOCD: CPT | Performed by: STUDENT IN AN ORGANIZED HEALTH CARE EDUCATION/TRAINING PROGRAM

## 2024-12-02 PROCEDURE — 7100000010 HC PHASE TWO TIME - EACH INCREMENTAL 1 MINUTE: Performed by: INTERNAL MEDICINE

## 2024-12-02 PROCEDURE — 2550000001 HC RX 255 CONTRASTS: Performed by: INTERNAL MEDICINE

## 2024-12-02 PROCEDURE — 1036F TOBACCO NON-USER: CPT | Performed by: STUDENT IN AN ORGANIZED HEALTH CARE EDUCATION/TRAINING PROGRAM

## 2024-12-02 PROCEDURE — 1159F MED LIST DOCD IN RCRD: CPT | Performed by: STUDENT IN AN ORGANIZED HEALTH CARE EDUCATION/TRAINING PROGRAM

## 2024-12-02 PROCEDURE — 2500000005 HC RX 250 GENERAL PHARMACY W/O HCPCS: Performed by: INTERNAL MEDICINE

## 2024-12-02 PROCEDURE — 7100000009 HC PHASE TWO TIME - INITIAL BASE CHARGE: Performed by: INTERNAL MEDICINE

## 2024-12-02 PROCEDURE — 96373 THER/PROPH/DIAG INJ IA: CPT | Performed by: INTERNAL MEDICINE

## 2024-12-02 RX ORDER — LISINOPRIL 20 MG/1
20 TABLET ORAL DAILY
COMMUNITY
End: 2024-12-05 | Stop reason: WASHOUT

## 2024-12-02 RX ORDER — LIDOCAINE HYDROCHLORIDE 20 MG/ML
INJECTION, SOLUTION INFILTRATION; PERINEURAL AS NEEDED
Status: DISCONTINUED | OUTPATIENT
Start: 2024-12-02 | End: 2024-12-02 | Stop reason: HOSPADM

## 2024-12-02 RX ORDER — FENTANYL CITRATE 50 UG/ML
INJECTION, SOLUTION INTRAMUSCULAR; INTRAVENOUS AS NEEDED
Status: DISCONTINUED | OUTPATIENT
Start: 2024-12-02 | End: 2024-12-02 | Stop reason: HOSPADM

## 2024-12-02 RX ORDER — MIDAZOLAM HYDROCHLORIDE 1 MG/ML
INJECTION, SOLUTION INTRAMUSCULAR; INTRAVENOUS AS NEEDED
Status: DISCONTINUED | OUTPATIENT
Start: 2024-12-02 | End: 2024-12-02 | Stop reason: HOSPADM

## 2024-12-02 RX ORDER — OMEPRAZOLE 20 MG/1
20 CAPSULE, DELAYED RELEASE ORAL DAILY
Qty: 90 CAPSULE | Refills: 0 | Status: SHIPPED | OUTPATIENT
Start: 2024-12-02

## 2024-12-02 RX ORDER — VERAPAMIL HYDROCHLORIDE 2.5 MG/ML
INJECTION, SOLUTION INTRAVENOUS AS NEEDED
Status: DISCONTINUED | OUTPATIENT
Start: 2024-12-02 | End: 2024-12-02 | Stop reason: HOSPADM

## 2024-12-02 RX ORDER — EZETIMIBE 10 MG/1
10 TABLET ORAL DAILY
Qty: 30 TABLET | Refills: 11 | Status: SHIPPED | OUTPATIENT
Start: 2024-12-02 | End: 2024-12-05 | Stop reason: SDUPTHER

## 2024-12-02 RX ORDER — NITROGLYCERIN 40 MG/100ML
INJECTION INTRAVENOUS AS NEEDED
Status: DISCONTINUED | OUTPATIENT
Start: 2024-12-02 | End: 2024-12-02 | Stop reason: HOSPADM

## 2024-12-02 RX ORDER — HEPARIN SODIUM 1000 [USP'U]/ML
INJECTION, SOLUTION INTRAVENOUS; SUBCUTANEOUS AS NEEDED
Status: DISCONTINUED | OUTPATIENT
Start: 2024-12-02 | End: 2024-12-02 | Stop reason: HOSPADM

## 2024-12-02 ASSESSMENT — ENCOUNTER SYMPTOMS
RESPIRATORY NEGATIVE: 1
CONSTITUTIONAL NEGATIVE: 1
CARDIOVASCULAR NEGATIVE: 1
MUSCULOSKELETAL NEGATIVE: 1
NEUROLOGICAL NEGATIVE: 1
HEMATOLOGIC/LYMPHATIC NEGATIVE: 1
GASTROINTESTINAL NEGATIVE: 1
PSYCHIATRIC NEGATIVE: 1

## 2024-12-02 ASSESSMENT — PAIN SCALES - GENERAL
PAINLEVEL_OUTOF10: 0 - NO PAIN

## 2024-12-02 ASSESSMENT — PAIN - FUNCTIONAL ASSESSMENT
PAIN_FUNCTIONAL_ASSESSMENT: 0-10

## 2024-12-02 ASSESSMENT — COLUMBIA-SUICIDE SEVERITY RATING SCALE - C-SSRS
1. IN THE PAST MONTH, HAVE YOU WISHED YOU WERE DEAD OR WISHED YOU COULD GO TO SLEEP AND NOT WAKE UP?: NO
6. HAVE YOU EVER DONE ANYTHING, STARTED TO DO ANYTHING, OR PREPARED TO DO ANYTHING TO END YOUR LIFE?: NO
2. HAVE YOU ACTUALLY HAD ANY THOUGHTS OF KILLING YOURSELF?: NO

## 2024-12-02 NOTE — SIGNIFICANT EVENT
Discharge instructions given to Patient with affirmation of verbal understanding.  Pt ambulated to restroom without difficulty or incident.  Pt transported to front door via wheelchair.

## 2024-12-02 NOTE — PROGRESS NOTES
Primary Care Physician: Iraj Merritt MD   Date of Visit: 2024 11:00 AM EST  Type of Visit: follow up       Chief Complaint:  No chief complaint on file.       HPI  Van Latham 69 y.o. male with hx of MIREYA on CPAP, HTN, HLD, GERD referred for CT Ca score 180. ECHO 12/15/2023 LVSF 60-65%, aortic valve sclerosis.      Zio with afib 25% and 2 episodes of NSVT  ( 18 beats at , 5 beats at )     [place] for follow up   He has not started entresto yet  Follows with clinical pharmacy     In afib today     No le edema  No chest pain  He gets intermittent sob with severe exertion  No dizziness  He had an episode of syncope 2 years after he stranded up from a sitting position, he got lightheaded prior to the syncope.   His father  suddenly in his 50s, was told from MI and enlarged heart    Both brothers see cardiology for heart issues, but no one in the family has the diagnosis of HCM or been tested for it     BP at home 120-130s range        Review of Systems   Review of Systems   12 points review of systems are negative expect for the above    Social History:  Social History     Socioeconomic History    Marital status:      Spouse name: Not on file    Number of children: Not on file    Years of education: Not on file    Highest education level: Not on file   Occupational History    Not on file   Tobacco Use    Smoking status: Never    Smokeless tobacco: Never   Substance and Sexual Activity    Alcohol use: Not Currently     Alcohol/week: 2.0 standard drinks of alcohol     Types: 2 Glasses of wine per week     Comment: rare    Drug use: Never    Sexual activity: Yes     Partners: Female     Birth control/protection: None   Other Topics Concern    Not on file   Social History Narrative    Not on file     Social Drivers of Health     Financial Resource Strain: Not on file   Food Insecurity: Not on file   Transportation Needs: Not on file   Physical Activity: Not on file   Stress: Not on file  "  Social Connections: Not on file   Intimate Partner Violence: Not on file   Housing Stability: Not on file        Past Medical History:  Past Medical History:   Diagnosis Date    Acute bronchitis 12/12/2023    Acute laryngitis 12/12/2023    Acute lower UTI 12/12/2023    Acute pharyngitis 12/12/2023    Arthritis 1/15/2020    Body mass index (BMI) 33.0-33.9, adult 05/17/2021    Body mass index (BMI) of 33.0 to 33.9 in adult    Body mass index (BMI) 33.0-33.9, adult 11/11/2019    BMI 33.0-33.9,adult    Chest pressure 12/12/2023    Daytime somnolence 12/12/2023    Fatigue 12/12/2023    GERD (gastroesophageal reflux disease) 6/1/2013    Hypercholesteremia 12/12/2023    Hypertension 6/1/2016    Occult blood in stools 12/12/2023    Pain of left heel 12/12/2023    Pure hypercholesterolemia, unspecified 12/13/2022    Hypercholesteremia    Pure hyperglyceridemia     Hypertriglyceridemia    Snoring 12/12/2023    Varicella 12/15/1961       Past Surgical History:  Past Surgical History:   Procedure Laterality Date    CIRCUMCISION, PRIMARY  1956    FRACTURE SURGERY  8/15/1963    PROSTATE SURGERY  08/2022    VASECTOMY  7/1/1986       Family History:  Family History   Problem Relation Name Age of Onset    Other (passed of heart attack) Father Prashanth     Hypertension Father Prashanth     Cancer Sister Sade     Miscarriages / Stillbirths Mother Saritha     Hypertension Brother Jeffrey     Intellectual Disability Brother Jonathan         Objective:       10/9/2024     3:28 PM 10/23/2024     8:35 AM 11/26/2024    11:32 AM 12/2/2024     7:22 AM 12/2/2024     8:17 AM 12/2/2024     9:00 AM 12/2/2024     9:15 AM   Vitals   Systolic 142 118  156 154 104 111   Diastolic 68 78  100 103 76 69   BP Location  Left arm    Right arm    Heart Rate 86 52  90 115 99 94   Resp    18 22 14 19   Height  1.778 m (5' 10\") 1.778 m (5' 10\")       Weight (lb) 229.5 223 224       BMI 32.93 kg/m2 32 kg/m2 32.14 kg/m2       BSA (m2) 2.27 m2 2.23 m2 2.24 m2     "   Visit Report Report Report           Constitutional:       Appearance: Healthy appearance. Not in distress.   Neck:      Vascular: No JVR. JVD normal.   Pulmonary:      Effort: Pulmonary effort is normal.      Breath sounds: Normal breath sounds. No wheezing. No rhonchi. No rales.   Chest:      Chest wall: Not tender to palpatation.   Cardiovascular:      IRR     Murmurs: There is no murmur.      No gallop.  N No rub.   Pulses:     Intact distal pulses.   Edema:     Peripheral edema absent.   Abdominal:      General: Bowel sounds are normal.      Palpations: Abdomen is soft.      Tenderness: There is no abdominal tenderness.   Musculoskeletal: Normal range of motion.         General: No tenderness.   Skin:     General: Skin is warm and dry.   Neurological:      General: No focal deficit present.      Mental Status: Alert and oriented to person, place and time.     Allergies:  No Known Allergies    Medications:  Current Outpatient Medications   Medication Instructions    amLODIPine (NORVASC) 2.5 mg, oral, Daily    apixaban (ELIQUIS) 5 mg, oral, 2 times daily    cholecalciferol (VITAMIN D3) 50 mcg, oral, Daily    fenofibrate (TRICOR) 145 mg, oral, Daily    lisinopril 20 mg, Daily    meloxicam (MOBIC) 15 mg, oral, Daily PRN    metoprolol succinate XL (TOPROL-XL) 50 mg, oral, Daily, Do not crush or chew.    omega 3-dha-epa-fish oil 360 mg-108 mg- 180 mg-1,200 mg capsule Take by mouth.    omeprazole (PRILOSEC) 20 mg, oral, Daily    rosuvastatin (CRESTOR) 20 mg, oral, Daily    sacubitriL-valsartan (Entresto) 24-26 mg tablet 1 tablet, oral, 2 times daily    sildenafil (VIAGRA) 100 mg, oral, Daily PRN    tadalafil (CIALIS) 10 mg, oral, Daily PRN    tadalafil (CIALIS) 5 mg, oral, Daily        Labs and Imaging:     Lab Results   Component Value Date    WBC 6.3 11/11/2024    HGB 14.8 11/11/2024    HCT 45.3 11/11/2024     11/11/2024    CHOL 139 11/11/2024    TRIG 142 11/11/2024    HDL 26.5 11/11/2024    ALT 26  "11/11/2024    AST 28 11/11/2024     11/11/2024    K 4.2 11/11/2024     (H) 11/11/2024    CREATININE 1.06 11/11/2024    BUN 19 11/11/2024    CO2 23 11/11/2024    TSH 1.96 11/11/2024    PSA 5.6 (H) 12/20/2021    INR 1.1 03/07/2022    HGBA1C 5.8 (A) 11/23/2021         Echocardiogram: No results found for this or any previous visit from the past 1825 days.    Stress Testing: No results found for this or any previous visit from the past 1825 days.    Cardiac Catheterization: No results found for this or any previous visit from the past 1825 days.    Cardiac Scoring:   CT cardiac scoring wo IV contrast 06/27/2023    Narrative  Interpreted By:  HELADIO WHEELER MD  MRN: [id number]  Patient Name: DARIAN HAMM    STUDY:  CT CARDIAC SCORING;  6/27/2023 7:42 am    INDICATION:  HTN, heart risk scoring.    COMPARISON:  None.    ACCESSION NUMBER(S):  [id number]    ORDERING CLINICIAN:  JP RUBY    TECHNIQUE:  Using prospective ECG gating, CT scan of the coronary arteries was  performed without intravenous contrast. Coronary calcium scoring  was  performed according to the method of Agatston.    FINDINGS:  The score and distribution of calcium in the coronary arteries is as  follows:    LM 0,  LAD 68.93,  LCx 28.06,  RCA 83.91,    Total 180.9    The visualized mid/lower ascending thoracic aorta measures 3.3 cm in  diameter. The heart is normal in size. No pericardial effusion is  present.    No gross mediastinal or hilar lymphadenopathy or mass is identified.  The visualized segments of the lungs are clear aside from faint  \"ground-glass\" changes in the basal left lower lobe and medial right  lower lobe that may be subsegmental atelectasis or chronic but are  not entirely specific and should be correlated clinically.    The visualized subdiaphragmatic structures appear normal.    Impression  1. Coronary artery calcium score of 180.9*.    *Coronary artery calcium scoring may be helpful in predicting the  risk for " "future coronary heart disease events.  According to the  American College of Cardiology Foundation Clinical Expert Consensus  Task Force, such testing provides important prognostic information in  patients with more than one coronary heart disease risk factor. The  coronary artery calcium score correlates with the annual risk of a  non-fatal myocardial infarction or coronary heart disease death.    Coronary artery score            Annual Risk    0-99                             0.4%  100-399                        1.3%  >400                            2.4%    These three \"breakpoints\" correspond to lower, intermediate and high  risk states for future coronary events.  Such information should be  used, along with appropriate clinical judgment, to make decisions  regarding the intensity of risk factor management strategies to treat  blood lipids and to modify other non-lipid coronary risk factors.    Reference: [place] P et al. Circulation.  2007; 115:402-426    AAA : No results found for this or any previous visit from the past 1825 days.    OTHER: No results found for this or any previous visit from the past 1825 days.      The 10-year ASCVD risk score (Maykel AVITIA, et al., 2019) is: 13.9%    Values used to calculate the score:      Age: 69 years      Sex: Male      Is Non- : No      Diabetic: No      Tobacco smoker: No      Systolic Blood Pressure: 111 mmHg      Is BP treated: No      HDL Cholesterol: 26.5 mg/dL      Total Cholesterol: 139 mg/dL       Assessment/Plan:   No diagnosis found.   Van Latham 69 y.o. male with hx of MIREYA on CPAP, HTN, HLD, GERD referred for CT Ca score 180. ECHO 12/15/2023 LVSF 60-65%, aortic valve sclerosis.      Zio with afib 25% and 2 episodes of NSVT  ( 18 beats at , 5 beats at , which is significant for HCM patients)       Echo in afib with LVEF 48% with concerning mid-apical hypertrophy, can not exclude HCM. No intracavitary gradient noted at " rest    Nuc negative, LVEF 36%    Underwent LHC today with mild non obstructive disease in LCX    CMR with normal LVEF, apical HCM with max thickness 1.7cm with apical displacement of the papillary muscles. No LV aneurysm. Scar burden 6%    AHA HCM risk score for SCD is 6.21%, class 2A indication for ICD 1ry prevention (as above)     Cardiomyopathy is probably tachy-mediated in the setting of Apical hypertrophic cardiomyopathy    NYHA class II symptoms  Euvolemic    Plan  Continue Fenofibrate   Continue Crestor   Continue Eliquis   Continue Torpol XL   Switch lisinopril to entresto 72 hr wash out period  Consider stopping amlodipine if BP runs low after starting entresto   Will start Jardiance  Will add zetia   We dicussed in details the new diagnosis of HCM and I answered all his questions   Refer to cardiac genetics  Recommend 1st degree relatives screening   Follow up with EP regarding afib ablation  I discussed with him and his wife the risk of SCD and consideration for ICD placement for 1ry prevention, he will think about it. I updated Dr. Nagy as well    RTC after 6 months with an echo HCM protocol prior to appt      Time Spent: I spent 40 minutes reviewing medical testing, obtaining medical history and counselling and educating on diagnosis and documenting clinical encounter.         ____________________________________________________________  Piotr Sood MD   of Medicine  Division of Cardiovascular Medicine   [facility] [place]  [facility]

## 2024-12-02 NOTE — H&P
History Of Present Illness  Van Latham is a 69 y.o. male from home with h/o elevated CT CAC score (180 6/2023), newly identified paroxysmal a.fib (25% burden zio 8/2024), apical HCM (EF 36% pharm nuc 11/1/2024--> EF 64% CMR 11/26/2024), htn, hld, MIREYA on CPAP presenting today for elective LHC.     Past Medical History  Past Medical History:   Diagnosis Date    Acute bronchitis 12/12/2023    Acute laryngitis 12/12/2023    Acute lower UTI 12/12/2023    Acute pharyngitis 12/12/2023    Arthritis 1/15/2020    Body mass index (BMI) 33.0-33.9, adult 05/17/2021    Body mass index (BMI) of 33.0 to 33.9 in adult    Body mass index (BMI) 33.0-33.9, adult 11/11/2019    BMI 33.0-33.9,adult    Chest pressure 12/12/2023    Daytime somnolence 12/12/2023    Fatigue 12/12/2023    GERD (gastroesophageal reflux disease) 6/1/2013    Hypercholesteremia 12/12/2023    Hypertension 6/1/2016    Occult blood in stools 12/12/2023    Pain of left heel 12/12/2023    Pure hypercholesterolemia, unspecified 12/13/2022    Hypercholesteremia    Pure hyperglyceridemia     Hypertriglyceridemia    Snoring 12/12/2023    Varicella 12/15/1961       Surgical History  Past Surgical History:   Procedure Laterality Date    CIRCUMCISION, PRIMARY  1956    FRACTURE SURGERY  8/15/1963    PROSTATE SURGERY  08/2022    VASECTOMY  7/1/1986        Social History  He reports that he has never smoked. He has never used smokeless tobacco. He reports that he does not currently use alcohol after a past usage of about 2.0 standard drinks of alcohol per week. He reports that he does not use drugs.    Family History  Family History   Problem Relation Name Age of Onset    Other (passed of heart attack) Father Prashanth     Hypertension Father Prashanth     Cancer Sister Sade     Miscarriages / Stillbirths Mother Saritha     Hypertension Brother Jeffrey     Intellectual Disability Brother Jonathan         Allergies  Patient has no known allergies.    Review of Systems   Constitutional:  Negative.    HENT: Negative.     Respiratory: Negative.     Cardiovascular: Negative.    Gastrointestinal: Negative.    Genitourinary: Negative.    Musculoskeletal: Negative.    Skin: Negative.    Neurological: Negative.    Hematological: Negative.    Psychiatric/Behavioral: Negative.          Physical Exam  Constitutional:       Appearance: Normal appearance.   HENT:      Head: Normocephalic and atraumatic.      Nose: Nose normal.      Mouth/Throat:      Mouth: Mucous membranes are moist.      Pharynx: Oropharynx is clear.   Cardiovascular:      Rate and Rhythm: Tachycardia present. Rhythm irregular.      Pulses: Normal pulses.      Heart sounds: Normal heart sounds.   Pulmonary:      Effort: Pulmonary effort is normal.      Breath sounds: Normal breath sounds.   Abdominal:      General: Abdomen is flat. Bowel sounds are normal.      Palpations: Abdomen is soft.   Musculoskeletal:         General: Normal range of motion.      Cervical back: Normal range of motion and neck supple.   Skin:     General: Skin is warm and dry.      Capillary Refill: Capillary refill takes 2 to 3 seconds.   Neurological:      General: No focal deficit present.      Mental Status: He is alert and oriented to person, place, and time.   Psychiatric:         Mood and Affect: Mood normal.          Last Recorded Vitals  Blood pressure (!) 156/100, pulse 90, resp. rate 18, SpO2 96%.    Relevant Results         Assessment/Plan   Assessment & Plan  Cardiomyopathy    Van Latham is a 69 y.o. male from home with h/o elevated CT CAC score (180 6/2023), newly identified paroxysmal a.fib (25% burden zio 8/2024), apical HCM (EF 36% pharm nuc 11/1/2024--> EF 64% CMR 11/26/2024), htn, hld, MIREYA on CPAP presenting today for elective LHC.         Ronit Eli, APRN-CNP

## 2024-12-03 NOTE — SIGNIFICANT EVENT
Cath Lab Procedure Call Back  Procedure Date: __12/2/24_____________   Procedure Performed:________LHC____________  Physician:__Stefano_____________  Spoke with:___Jon__________________________  Date/Time Contacted: 1st attempt: __12/3/24 1057_______ ___:____ 2nd attempt: _________ ___:____  Phone#:_______________ Unable to reach/Left message:____________  Access Site: Pain______Bleeding______Bruised______Infection______WNL___xx___  Dressing Removal Date:____today__________ Anticoagulation Post Intervention_________  Satisfied with Care:_____yes___________ D/C Instructions adequate____yes___________  Follow Up Appointment:_____yes________________ Length of Call:____2 min______________  Comments:______________________________________________________________________________________________________________________________________________

## 2024-12-05 DIAGNOSIS — I10 PRIMARY HYPERTENSION: ICD-10-CM

## 2024-12-05 DIAGNOSIS — G47.30 SLEEP APNEA, UNSPECIFIED TYPE: ICD-10-CM

## 2024-12-05 DIAGNOSIS — I25.10 CORONARY ARTERY CALCIFICATION: ICD-10-CM

## 2024-12-05 DIAGNOSIS — I48.0 PAROXYSMAL A-FIB (MULTI): ICD-10-CM

## 2024-12-05 DIAGNOSIS — I42.9 CARDIOMYOPATHY, UNSPECIFIED TYPE (MULTI): ICD-10-CM

## 2024-12-05 RX ORDER — EZETIMIBE 10 MG/1
10 TABLET ORAL DAILY
Qty: 90 TABLET | Refills: 3 | Status: SHIPPED | OUTPATIENT
Start: 2024-12-05 | End: 2025-12-05

## 2024-12-09 DIAGNOSIS — I10 PRIMARY HYPERTENSION: ICD-10-CM

## 2024-12-09 DIAGNOSIS — I42.9 CARDIOMYOPATHY, UNSPECIFIED TYPE (MULTI): ICD-10-CM

## 2024-12-09 DIAGNOSIS — I25.10 CORONARY ARTERY CALCIFICATION: ICD-10-CM

## 2024-12-09 DIAGNOSIS — I48.0 PAROXYSMAL A-FIB (MULTI): ICD-10-CM

## 2024-12-09 DIAGNOSIS — G47.30 SLEEP APNEA, UNSPECIFIED TYPE: ICD-10-CM

## 2024-12-16 NOTE — PROGRESS NOTES
[facility] Heart [facility] Vascular Electrophysiology    Patient Name: Van Latham  Patient : 1955    Referred for  Afib F/U    History of Present Illness:  Van Latham is a 69 y.o. year old male patient with:    pAfib : ZIO showing 25% burden of atrial fibrillation with rates varying from 62 to 213 bpm average of 121 bpm. The patient does feel some mild palpitations and fatigue occasionally. He is on Eliquis and metoprolol.   HTN   HLD   GERD   CT Ca score 180   Intermittent sob and palpitations   MIREYA with CPAP      Patient referred by Dr. Sood for paroxysmal A-fib.  He had a ZIO showing 25% burden of atrial fibrillation with rates varying from 62 to 2013 bpm average of 121 bpm.  The patient does feel some mild palpitations and fatigue occasionally.  Patient was recently diagnosed with hypertrophic cardiomyopathy. He had an episode of syncope after he got up from a sitting position, he got lightheaded prior to the syncope. His father  suddenly in his 50s, was told from MI and enlarged heart. His EKG shows atrial fibrillation with HR ~ 120s.  Patient states that at home his heart rates are usually under 100 bpm (normally 50s-60s).       Past Medical History:  He has a past medical history of Acute bronchitis (2023), Acute laryngitis (2023), Acute lower UTI (2023), Acute pharyngitis (2023), Arthritis (1/15/2020), Body mass index (BMI) 33.0-33.9, adult (2021), Body mass index (BMI) 33.0-33.9, adult (2019), Chest pressure (2023), Daytime somnolence (2023), Fatigue (2023), GERD (gastroesophageal reflux disease) (2013), Hypercholesteremia (2023), Hypertension (2016), Occult blood in stools (2023), Pain of left heel (2023), Pure hypercholesterolemia, unspecified (2022), Pure hyperglyceridemia, Snoring (2023), and Varicella (12/15/1961).    Past Surgical History:  He has a past surgical history that includes  Prostate surgery (08/2022); Circumcision, primary (1955); Fracture surgery (8/15/1963); Vasectomy (7/1/1986); and Cardiac catheterization (N/A, 12/2/2024).      Social History:  He reports that he has never smoked. He has never used smokeless tobacco. He reports that he does not currently use alcohol after a past usage of about 2.0 standard drinks of alcohol per week. He reports that he does not use drugs.    Family History:  Family History   Problem Relation Name Age of Onset    Other (passed of heart attack) Father Prashanth     Hypertension Father Prashanth     Cancer Sister Sade     Miscarriages / Stillbirths Mother Saritha     Hypertension Brother Jeffrey     Intellectual Disability Brother Jonathan         Allergies:  Patient has no known allergies.    Outpatient Medications:  Current Outpatient Medications   Medication Instructions    amLODIPine (NORVASC) 2.5 mg, oral, Daily    apixaban (ELIQUIS) 5 mg, oral, 2 times daily    cholecalciferol (VITAMIN D3) 50 mcg, oral, Daily    empagliflozin (JARDIANCE) 10 mg, oral, Daily    ezetimibe (ZETIA) 10 mg, oral, Daily    fenofibrate (TRICOR) 145 mg, oral, Daily    meloxicam (MOBIC) 15 mg, oral, Daily PRN    metoprolol succinate XL (TOPROL-XL) 50 mg, oral, Daily, Do not crush or chew.    omeprazole (PRILOSEC) 20 mg, oral, Daily    rosuvastatin (CRESTOR) 20 mg, oral, Daily    sacubitriL-valsartan (Entresto) 24-26 mg tablet 1 tablet, oral, 2 times daily    sildenafil (VIAGRA) 100 mg, oral, Daily PRN    tadalafil (CIALIS) 10 mg, oral, Daily PRN    tadalafil (CIALIS) 5 mg, oral, Daily        ROS:  A 14 point review of systems was done and is negative other than as stated in HPI    Physical Exam  Constitutional:       Appearance: Normal appearance.   Cardiovascular:      Rate and Rhythm: Normal rate and regular rhythm.      Heart sounds: No murmur heard.     No friction rub. No gallop.   Pulmonary:      Effort: Pulmonary effort is normal.      Breath sounds: Normal breath sounds.    Abdominal:      Palpations: Abdomen is soft.   Musculoskeletal:      Cervical back: Neck supple.   Neurological:      Mental Status: He is alert.   Psychiatric:         Mood and Affect: Mood normal.         Behavior: Behavior normal.          Vitals:  There were no vitals taken for this visit.       Labs:   CBC  Lab Results   Component Value Date    WBC 6.3 11/11/2024    HGB 14.8 11/11/2024    HCT 45.3 11/11/2024    MCV 89 11/11/2024     11/11/2024        Renal Function Panel  Lab Results   Component Value Date    GLUCOSE 105 (H) 11/11/2024     11/11/2024    K 4.2 11/11/2024     (H) 11/11/2024    CO2 23 11/11/2024    ANIONGAP 11 11/11/2024    BUN 19 11/11/2024    CREATININE 1.06 11/11/2024    GFRMALE 67 12/13/2022    CALCIUM 10.3 11/11/2024        CMP  Lab Results   Component Value Date    CALCIUM 10.3 11/11/2024    PROT 7.3 11/11/2024    ALBUMIN 4.1 11/11/2024    AST 28 11/11/2024    ALT 26 11/11/2024    ALKPHOS 40 11/11/2024    BILITOT 0.5 11/11/2024       TSH  Lab Results   Component Value Date    TSH 1.96 11/11/2024          Cardiac Testing:  ECG  12/18/2024  His EKG shows atrial fibrillation with HR ~ 120s.      Echocardiogram  11/01/2024  PHYSICIAN INTERPRETATION:  Left Ventricle: The left ventricular systolic function is mildly decreased, with a Masters's biplane calculated ejection fraction of 48%. The patient is in atrial fibrillation which may influence the estimate of left ventricular function and transvalvular flows. There is mildly increased left ventricular hypertrophy. There is global hypokinesis of the left ventricle with minor regional variations. The left ventricular cavity size is mildly dilated. There is left ventricular concentric remodeling. Left ventricular diastolic filling cannot be determined, due to E/A wave fusion.  Left Atrium: The left atrium is normal in size.  Right Ventricle: The right ventricle is normal in size. There is normal right ventricular global systolic  function.  Right Atrium: The right atrium is normal in size.  Aortic Valve: The aortic valve is trileaflet. The aortic valve dimensionless index is 1.30. There is no evidence of aortic valve regurgitation. The peak instantaneous gradient of the aortic valve is 4 mmHg. The mean gradient of the mitral valve is 2 mmHg.  Mitral Valve: The mitral valve is normal in structure. There is trace mitral valve regurgitation.  Tricuspid Valve: The tricuspid valve is structurally normal. There is trace to mild tricuspid regurgitation. The right ventricular systolic pressure is unable to be estimated.  Pulmonic Valve: The pulmonic valve is not well visualized. There is physiologic pulmonic valve regurgitation.  Pericardium: Trivial to small pericardial effusion.  Aorta: The aortic root is normal. The aortic root is at the upper limits of normal size.  Systemic Veins: The inferior vena cava appears normal in size, with IVC inspiratory collapse greater than 50%.  In comparison to the previous echocardiogram(s): Compared with study dated 12/15/2023, LVEF has dropped, patient is in afib.        CONCLUSIONS:   1. The left ventricular systolic function is mildly decreased, with a Masters's biplane calculated ejection fraction of 48%.   2. There is global hypokinesis of the left ventricle with minor regional variations.   3. Left ventricular diastolic filling cannot be determined, due to E/A wave fusion.   4. Left ventricular cavity size is mildly dilated.   5. There is normal right ventricular global systolic function.   6. Abnormal thickeness of the mid-apical LV wall, can not exclude Hypertrophic cardiomyopathy. No LV aneurysm. Cardiac MRI is recommended for further assessement.   7. The patient is in atrial fibrillation which may influence the estimate of left ventricular function and transvalvular flows.    Nuclear Stress Test  11/01/2024  FINDINGS:  Both stress and rest studies demonstrate grossly normal perfusion  throughout the  left ventricle.      The left ventricle is normal in size.      Gated images demonstrate normal LV wall motion with a  LV EF  estimated at 36%.      Attenuation correction CT images demonstrate no gross anatomic  abnormalities.      IMPRESSION:  1. Negative myocardial perfusion study without evidence of inducible  myocardial ischemia or prior infarction.  2. The left ventricle is normal in size.  3. Normal LV wall motion with a post-stress LV EF estimated at 36%,  likely underestimated, correlate clinically.      HOLTER MONITOR  08/27/2024  Patient had a min HR of 40 bpm, max HR of 213 bpm, and avg HR of 76 bpm.  Predominant underlying rhythm was Sinus Rhythm. 2 Ventricular Tachycardia runs  occurred, the run with the fastest interval lasting 5 beats with a max rate of 146  bpm, the longest lasting 18 beats with an avg rate of 130 bpm. 55 Supraventricular  Tachycardia runs occurred, the run with the fastest interval lasting 5 beats with a  max rate of 207 bpm, the longest lasting 14.2 secs with an avg rate of 106 bpm.  Atrial Fibrillation occurred (25% burden), ranging from  bpm (avg of 121  bpm), the longest lasting 3 days 11 hours with an avg rate of 121 bpm. Atrial  Fibrillation was detected within +/- 45 seconds of symptomatic patient event(s).  Isolated SVEs were rare (<1.0%), SVE Couplets were rare (<1.0%), and SVE Triplets  were rare (<1.0%). Isolated VEs were rare (<1.0%, 4449), VE Triplets were rare  (<1.0%, 3), and no VE Couplets were present. Difficulty discerning atrial activity  making definitive diagnosis difficult to ascertain.    Assessment:     Patient referred by Dr. Sood for paroxysmal A-fib.  He had a ZIO showing 25% burden of atrial fibrillation with rates varying from 62 to 213 bpm average of 121 bpm.  The patient does feel some mild palpitations and fatigue occasionally.      Patient was recently diagnosed with hypertrophic cardiomyopathy. He had an episode of syncope after he got up  from a sitting position, he got lightheaded prior to the syncope. His father  suddenly in his 50s, was told from MI and enlarged heart. His EKG shows atrial fibrillation with HR ~ 120s.  Patient states that at home his heart rates are usually under 100 bpm (normally 50s-60s).    I had a long discussion with the patient about atrial fibrillation and treatment options, including rate control and rhythm control with medications and catheter ablation. I explained the risks, benefits and procedure details of catheter ablation of atrial fibrillation. The risks include, but are not limited to bleeding, infection, and pain from the catheter insertion, damage to the blood vessels from the catheter, puncture to the heart, blood clots, which might lead to a stroke, narrowing of the pulmonary veins, radiation exposure and death. Success rate of the procedure is usually between 50-85%, depending on several factors including patient's comorbidities and type/duration of atrial fibrillation. The patient would like to proceed with catheter ablation for atrial fibrillation. Will schedule.

## 2024-12-18 ENCOUNTER — APPOINTMENT (OUTPATIENT)
Dept: CARDIOLOGY | Facility: CLINIC | Age: 69
End: 2024-12-18
Payer: MEDICARE

## 2024-12-18 VITALS
WEIGHT: 222 LBS | HEART RATE: 122 BPM | RESPIRATION RATE: 16 BRPM | SYSTOLIC BLOOD PRESSURE: 98 MMHG | DIASTOLIC BLOOD PRESSURE: 64 MMHG | BODY MASS INDEX: 31.85 KG/M2

## 2024-12-18 DIAGNOSIS — I42.2 CARDIOMYOPATHY, HYPERTROPHIC (MULTI): Primary | ICD-10-CM

## 2024-12-18 DIAGNOSIS — I48.0 PAROXYSMAL A-FIB (MULTI): ICD-10-CM

## 2024-12-18 PROCEDURE — 99214 OFFICE O/P EST MOD 30 MIN: CPT | Performed by: STUDENT IN AN ORGANIZED HEALTH CARE EDUCATION/TRAINING PROGRAM

## 2024-12-18 PROCEDURE — 1036F TOBACCO NON-USER: CPT | Performed by: STUDENT IN AN ORGANIZED HEALTH CARE EDUCATION/TRAINING PROGRAM

## 2024-12-18 PROCEDURE — 1126F AMNT PAIN NOTED NONE PRSNT: CPT | Performed by: STUDENT IN AN ORGANIZED HEALTH CARE EDUCATION/TRAINING PROGRAM

## 2024-12-18 PROCEDURE — 3074F SYST BP LT 130 MM HG: CPT | Performed by: STUDENT IN AN ORGANIZED HEALTH CARE EDUCATION/TRAINING PROGRAM

## 2024-12-18 PROCEDURE — 1123F ACP DISCUSS/DSCN MKR DOCD: CPT | Performed by: STUDENT IN AN ORGANIZED HEALTH CARE EDUCATION/TRAINING PROGRAM

## 2024-12-18 PROCEDURE — 93000 ELECTROCARDIOGRAM COMPLETE: CPT | Performed by: STUDENT IN AN ORGANIZED HEALTH CARE EDUCATION/TRAINING PROGRAM

## 2024-12-18 PROCEDURE — 1159F MED LIST DOCD IN RCRD: CPT | Performed by: STUDENT IN AN ORGANIZED HEALTH CARE EDUCATION/TRAINING PROGRAM

## 2024-12-18 PROCEDURE — 3078F DIAST BP <80 MM HG: CPT | Performed by: STUDENT IN AN ORGANIZED HEALTH CARE EDUCATION/TRAINING PROGRAM

## 2024-12-18 ASSESSMENT — ENCOUNTER SYMPTOMS
OCCASIONAL FEELINGS OF UNSTEADINESS: 0
LOSS OF SENSATION IN FEET: 0
DEPRESSION: 0

## 2024-12-18 ASSESSMENT — PAIN SCALES - GENERAL: PAINLEVEL_OUTOF10: 0-NO PAIN

## 2024-12-18 ASSESSMENT — PATIENT HEALTH QUESTIONNAIRE - PHQ9
SUM OF ALL RESPONSES TO PHQ9 QUESTIONS 1 AND 2: 0
1. LITTLE INTEREST OR PLEASURE IN DOING THINGS: NOT AT ALL
2. FEELING DOWN, DEPRESSED OR HOPELESS: NOT AT ALL

## 2024-12-18 ASSESSMENT — LIFESTYLE VARIABLES: TOTAL SCORE: 0

## 2025-02-03 DIAGNOSIS — R06.09 DOE (DYSPNEA ON EXERTION): ICD-10-CM

## 2025-02-03 DIAGNOSIS — I48.19 PERSISTENT ATRIAL FIBRILLATION (MULTI): ICD-10-CM

## 2025-02-03 DIAGNOSIS — I25.10 CORONARY ARTERIOSCLEROSIS: ICD-10-CM

## 2025-02-03 DIAGNOSIS — R42 DIZZINESS AND GIDDINESS: ICD-10-CM

## 2025-02-03 DIAGNOSIS — I47.29 NSVT (NONSUSTAINED VENTRICULAR TACHYCARDIA) (MULTI): ICD-10-CM

## 2025-02-03 DIAGNOSIS — K21.9 GASTROESOPHAGEAL REFLUX DISEASE WITHOUT ESOPHAGITIS: ICD-10-CM

## 2025-02-03 DIAGNOSIS — R94.31 ABNORMAL EKG: ICD-10-CM

## 2025-02-03 DIAGNOSIS — I10 PRIMARY HYPERTENSION: ICD-10-CM

## 2025-02-03 DIAGNOSIS — R01.1 MURMUR, CARDIAC: ICD-10-CM

## 2025-02-03 DIAGNOSIS — E78.00 PURE HYPERCHOLESTEROLEMIA: ICD-10-CM

## 2025-02-03 DIAGNOSIS — N52.9 ERECTILE DYSFUNCTION, UNSPECIFIED ERECTILE DYSFUNCTION TYPE: ICD-10-CM

## 2025-02-03 RX ORDER — FENOFIBRATE 145 MG/1
145 TABLET, FILM COATED ORAL DAILY
Qty: 90 TABLET | Refills: 0 | Status: SHIPPED | OUTPATIENT
Start: 2025-02-03

## 2025-02-03 RX ORDER — SILDENAFIL 100 MG/1
100 TABLET, FILM COATED ORAL AS NEEDED
Qty: 90 TABLET | Refills: 0 | Status: SHIPPED | OUTPATIENT
Start: 2025-02-03

## 2025-02-03 RX ORDER — METOPROLOL SUCCINATE 50 MG/1
50 TABLET, EXTENDED RELEASE ORAL DAILY
Qty: 90 TABLET | Refills: 3 | Status: SHIPPED | OUTPATIENT
Start: 2025-02-03 | End: 2025-02-03 | Stop reason: SDUPTHER

## 2025-02-04 RX ORDER — METOPROLOL SUCCINATE 50 MG/1
50 TABLET, EXTENDED RELEASE ORAL DAILY
Qty: 90 TABLET | Refills: 3 | Status: SHIPPED | OUTPATIENT
Start: 2025-02-04 | End: 2026-02-04

## 2025-02-08 DIAGNOSIS — R97.20 BPH WITH ELEVATED PSA: ICD-10-CM

## 2025-02-08 DIAGNOSIS — N40.0 BPH WITH ELEVATED PSA: ICD-10-CM

## 2025-02-12 RX ORDER — TADALAFIL 5 MG/1
5 TABLET ORAL DAILY
Qty: 30 TABLET | Refills: 0 | Status: SHIPPED | OUTPATIENT
Start: 2025-02-12 | End: 2025-02-15

## 2025-02-13 DIAGNOSIS — R97.20 BPH WITH ELEVATED PSA: ICD-10-CM

## 2025-02-13 DIAGNOSIS — N40.0 BPH WITH ELEVATED PSA: ICD-10-CM

## 2025-02-15 RX ORDER — TADALAFIL 5 MG/1
5 TABLET ORAL DAILY
Qty: 30 TABLET | Refills: 0 | Status: SHIPPED | OUTPATIENT
Start: 2025-02-15

## 2025-02-27 NOTE — PROGRESS NOTES
Subjective   Patient ID: Van Latham is a 69 y.o. male.    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    DISPOSITION:  The patient was discharged home after the procedure, per routine.    INDICATIONS: :  Mr. Latham is a 69 y.o. 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     The indications, risks and benefits of this procedure were discussed with the patient, consent was obtained prior to the procedure, and to the best of my judgement the patient seemed to understand and agree to the procedure.    PROCEDURE:  The patient  was brought into the procedure suite and informed consent was reviewed and confirmed. Vital signs were obtained prior to the procedure: There were no vitals taken for this visit..  The patient was escorted onto the stretcher, placed supine, prepped with betadine and draped in the usual standard surgical fashion.  Intraurethral 2% viscous lidocaine jelly was used for local analgesia.  A 16 Frisian flexible cystourethroscope was inserted into the urethra.   The penile urethra was normal.  The prostate urethra was wide open.  Upon entering the bladder the entire bladder was surveyed in a 360 degree fashion.  The left and right ureteral orifices were in normal orthotopic position effluxing clear yellow urine, bilaterally.   There was no evidence of any bladder lesions, foreign objects, stones or evidence of any mucosal changes. The cystoscope was then retroflexed.  The bladder neck was then further examined without any evidence of lesions. The scope was then removed and in an antegrade fashion, the urethra and bladder were again resurveyed with no  evidence of additional lesions.  The cystoscope was then fully removed.   The patient tolerated the procedure well.  Vitals were stable after the procedure.  The patient was able to void and was discharged home.  Verbal and written Post procedure instructions were reviewed with the patient.    IMPRESSION:  Wide open prostatic fossa  No recurrence     PLAN:  Cystoscopy in 1 year     HPI  69 y.o. male presents for a cystoscopy. 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 He is status post RASP on 08/11/2022 with benign pathology. Recent PSA 0.62    He is currently prescribed Cialis 5 mg daily with 10 mg on demand.     Review of Systems  A complete review of systems was performed. All systems are noted to be negative unless indicated in the history of present illness, impression, active problem list, or past histories.     Objective   Physical Exam    Assessment/Plan   Diagnoses and all orders for this visit:  Bladder tumor  -     Cystoscopy; Future  BPH with elevated PSA  -     tadalafil (Cialis) 5 mg tablet; Take 1 tablet (5 mg) by mouth once daily.  Erectile dysfunction, unspecified erectile dysfunction type  -     tadalafil (Cialis) 10 mg tablet; Take 1 tablet (10 mg) by mouth once daily as needed for erectile dysfunction.      69 y.o. male presents for a cystoscopy. 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 He is status post RASP on 08/11/2022 with benign pathology. Recent PSA 0.62    The patient underwent a cystoscopy today in office. He tolerated the procedure well without incidence. The details are above. No recurrence      Plan:  Cystoscopy in 1 year  Refill tadalafil 5mg and 10mg      Scribe Attestation   By signing my name below, I, Alina Downeyibluis attestation that this documentation has been prepared under the direction and in the presence of Jonah Whyte MD.

## 2025-02-27 NOTE — PROGRESS NOTES
Patient ID:   Van Latham is a 69 y.o. male with PMH remarkable for pAFib, apical hypertrophic cardiomyopathy, HTN, BPH s/p robotic simple prostatectomy (benign pathology), bladder tumor s/p flex cystourethroscopy on 2024, GERD, ED, MIREYA on bipap, who presents to the office today for Medicare Annual Wellness Visit Subsequent.    HEALTH MAINTENANCE: Annual Medicare Wellness Physical  Last Office Visit: 2024 for FU. Zio patch was ordered d/t murmur. Also his brother recently diagnosed with valve issue, other brother dx with high CT calcium scoring and is undergoing angiogram/ Father  at age 56 of cardiac arrest/ Reports that he had rapid heart rate for 10 minutes a couple weeks ago. His watch picked up on this, it was reported at 130-170. [place] lightheaded at this time. Before coffee, nothing strenuous was done. Has lost weight.  Last Labs: 2024  PSA Screening (starting at age 55 till 69): 0.62 on 2023  Colonoscopy (45-75 or age 40 with 1st degree relative dx colon ca): normal 1/15/2018 with Dr Gee. Rec repeat in 10 yrs. DUE .  Lung cancer screening (50-78 y/o x 20 pk yr for at least 20 yrs + current smoker OR quit in last 15 years, no CT w/I last year): no mention of nodules on ct cardiac scoring from 2023  CT Cardiac Scoring 2023 showed calcium score of 180.9  ECHO 12/15/2023 LVSF 60-65%, aortic valve sclerosis.   ECHO 2024 showed LVEF 48%, global hypokinesis of LV. LV mildly dilated, abnormal thickness of mid apical LV wall, can not exclude hypertrophic cardiomyopathy. Pt was in Afib at the time of echo.  -- repeat scheduled for 2025  Nuclear Stress test 2024 showed -ve for ischemia, EF 36%, likely underestimated.  Zio patch showed Afib 25% and 2 short episodes of NSVT.   Cardiac Cath 2024 with Dr Sood showed no significant stentable CAD. No aortic stenosis.   MRI Cardiac done 2024 showed apical hypertrophic cardiomyopathy. Focal hypertrophy of the  distal anterolateral wall 1.7cm. Apical displacement of the papillary muscles. No aneurysm. Findings were consistent with fibrosis. LVSF 64%, RVEF 58%    EKG at the cardiologist's office on 10/9/2024 showed Afib RVR. Crestor was increased to 20mg, started on metoprolol and eliqus. Echo and stress Test was ordered. Referred to EP for ablation vs CV- pt reports that it will be in April sometime.    Next cysto scheduled for 3/3/2025 with Dr Whyte    Went on a cruise with his sister. Came back and she has flu A. Thinks he caught it as well. Started early this week. 2 nights ago had chills and sweats.     STOP amlodipine  Will icnrease entresto if BP remains elevated.     Will rule out flu, covid - this was -ve in the office today.    Following Providers:  Cardio: Dr Sood  EP: Dr Tuttle    Past Medical History:   Diagnosis Date    Acute bronchitis 12/12/2023    Acute laryngitis 12/12/2023    Acute lower UTI 12/12/2023    Acute pharyngitis 12/12/2023    Arthritis 1/15/2020    Body mass index (BMI) 33.0-33.9, adult 05/17/2021    Body mass index (BMI) of 33.0 to 33.9 in adult    Body mass index (BMI) 33.0-33.9, adult 11/11/2019    BMI 33.0-33.9,adult    Chest pressure 12/12/2023    Daytime somnolence 12/12/2023    Fatigue 12/12/2023    GERD (gastroesophageal reflux disease) 6/1/2013    Hypercholesteremia 12/12/2023    Hypertension 6/1/2016    Occult blood in stools 12/12/2023    Pain of left heel 12/12/2023    Pure hypercholesterolemia, unspecified 12/13/2022    Hypercholesteremia    Pure hyperglyceridemia     Hypertriglyceridemia    Snoring 12/12/2023    Varicella 12/15/1961      Past Surgical History:   Procedure Laterality Date    CARDIAC CATHETERIZATION N/A 12/2/2024    Procedure: Left Heart Cath;  Surgeon: Jean Avila MD;  Location: [facility];  Service: Cardiovascular;  Laterality: N/A;    CIRCUMCISION, PRIMARY  1956    FRACTURE SURGERY  8/15/1963    PROSTATE SURGERY  08/2022     VASECTOMY  7/1/1986      Social History     Tobacco Use    Smoking status: Never    Smokeless tobacco: Never   Substance Use Topics    Alcohol use: Not Currently     Alcohol/week: 2.0 standard drinks of alcohol     Types: 2 Glasses of wine per week     Comment: rare    Drug use: Never     Family History   Problem Relation Name Age of Onset    Other (passed of heart attack) Father Prashanth     Hypertension Father Prashanth     Heart attack Father Prashanth     Cancer Sister Sade     Miscarriages / Stillbirths Mother Saritha     Hypertension Brother Jeffrey     Intellectual Disability Brother Jonathan       Immunization History   Administered Date(s) Administered    Flu vaccine (IIV4), preservative free *Check age/dose* 10/02/2013, 10/18/2017, 10/17/2018, 10/16/2019, 11/11/2019    Flu vaccine, quadrivalent, high-dose, preservative free, age 65y+ (FLUZONE) 11/16/2021    Flu vaccine, trivalent, preservative free, HIGH-DOSE, age 65y+ (Fluzone) 11/06/2024    Flu vaccine, trivalent, preservative free, age 6 months and greater (Fluarix/Fluzone/Flulaval) 10/29/2008, 10/19/2010, 10/03/2012    Influenza, Seasonal, Quadrivalent, Adjuvanted 12/04/2023    Influenza, injectable, quadrivalent 09/29/2020    Influenza, injectable, quadrivalent, preservative free, pediatric 11/04/2015    Influenza, seasonal, injectable 11/06/2018    Moderna COVID-19 vaccine, 12 years and older (50mcg/0.5mL)(Spikevax) 12/04/2023, 11/06/2024    Moderna SARS-CoV-2 Vaccination 02/25/2021, 03/25/2021, 12/01/2021, 06/30/2022    Pneumococcal conjugate vaccine, 20-valent (PREVNAR 20) 06/30/2022    Pneumococcal polysaccharide vaccine, 23-valent, age 2 years and older (PNEUMOVAX 23) 10/03/2013    Tdap vaccine, age 7 year and older (BOOSTRIX, ADACEL) 06/08/2016, 06/30/2022    Zoster vaccine, recombinant, adult (SHINGRIX) 10/09/2020, 01/16/2021     Review of Systems   Constitutional:  Positive for fatigue.   HENT:  Positive for postnasal drip and rhinorrhea.    Eyes: Negative.  "   Respiratory: Negative.     Cardiovascular: Negative.    Gastrointestinal: Negative.    Endocrine: Negative.    Genitourinary: Negative.    Musculoskeletal: Negative.    Skin: Negative.    Neurological: Negative.    Psychiatric/Behavioral: Negative.     All other systems reviewed and are negative.    No Known Allergies  Visit Vitals  /74 (BP Location: Left arm, Patient Position: Sitting)   Pulse 55   Resp 18   Ht 1.778 m (5' 10\")   Wt 103 kg (226 lb)   SpO2 92%   BMI 32.43 kg/m²   Smoking Status Never   BSA 2.26 m²     Physical Exam  Vitals reviewed. Exam conducted with a chaperone present.   Constitutional:       Appearance: Normal appearance. He is well-developed.   HENT:      Head: Normocephalic.      Right Ear: External ear normal.      Left Ear: External ear normal.      Nose: Nose normal.      Mouth/Throat:      Lips: Pink.      Mouth: Mucous membranes are moist.   Eyes:      General: Lids are normal.      Pupils: Pupils are equal, round, and reactive to light.   Neck:      Trachea: Trachea normal.   Cardiovascular:      Rate and Rhythm: Normal rate and regular rhythm.      Heart sounds: Murmur heard.      Systolic murmur is present.   Pulmonary:      Effort: Pulmonary effort is normal.      Breath sounds: Normal breath sounds.   Abdominal:      General: Bowel sounds are normal.      Palpations: Abdomen is soft.   Musculoskeletal:      Cervical back: Full passive range of motion without pain.   Skin:     General: Skin is warm and moist.   Neurological:      General: No focal deficit present.      Mental Status: He is alert and oriented to person, place, and time. Mental status is at baseline.   Psychiatric:         Attention and Perception: Attention normal.         Mood and Affect: Mood normal.         Speech: Speech normal.         Behavior: Behavior is cooperative.         Thought Content: Thought content normal.         Cognition and Memory: Cognition normal.         Judgment: Judgment normal. "       Current Outpatient Medications   Medication Instructions    amoxicillin-pot clavulanate (Augmentin) 875-125 mg tablet 875 mg, oral, 2 times daily    apixaban (ELIQUIS) 5 mg, oral, 2 times daily    empagliflozin (JARDIANCE) 10 mg, oral, Daily    ezetimibe (ZETIA) 10 mg, oral, Daily    fenofibrate (TRICOR) 145 mg, oral, Daily    fluticasone (Flonase) 50 mcg/actuation nasal spray 1 spray, Each Nostril, 2 times daily, Shake gently. Before first use, prime pump. After use, clean tip and replace cap.    levocetirizine (XYZAL) 5 mg, oral, Every evening    meloxicam (MOBIC) 15 mg, oral, Daily PRN    metoprolol succinate XL (TOPROL-XL) 50 mg, oral, Daily, Do not crush or chew.    omeprazole (PRILOSEC) 20 mg, oral, Daily    rosuvastatin (CRESTOR) 20 mg, oral, Daily    sacubitriL-valsartan (Entresto) 24-26 mg tablet 1 tablet, oral, 2 times daily    sildenafil (VIAGRA) 100 mg, oral, As needed    tadalafil (CIALIS) 10 mg, oral, Daily PRN    tadalafil (CIALIS) 5 mg, oral, Daily     Lab Results   Component Value Date    WBC 6.3 11/11/2024    HGB 14.8 11/11/2024    HCT 45.3 11/11/2024     11/11/2024    CHOL 139 11/11/2024    TRIG 142 11/11/2024    HDL 26.5 11/11/2024    ALT 26 11/11/2024    AST 28 11/11/2024     11/11/2024    K 4.2 11/11/2024     (H) 11/11/2024    CREATININE 1.06 11/11/2024    BUN 19 11/11/2024    CO2 23 11/11/2024    TSH 1.96 11/11/2024    PSA 5.6 (H) 12/20/2021    INR 1.1 03/07/2022    HGBA1C 5.8 (A) 11/23/2021     Problem List Items Addressed This Visit             ICD-10-CM    Primary hypertension I10     - STOP amlodipine d/t edema in LE         GERD (gastroesophageal reflux disease) K21.9     - c/w PPI         Pure hypercholesterolemia E78.00     - c/w statin, fenofibrate  - reviewed last lipid panel  - Patient educated and counseled to do walking and exercise regularly  - Low-fat low-cholesterol diet is advised  - Advised to reduce weight   - The goal is to keep the LDL less than 70,  and HDL the more than 40         Encounter for annual wellness visit (AWV) in Medicare patient Z00.00     Next cysto scheduled for 3/3/2025 with Dr Whyte         Paroxysmal A-fib (Multi) I48.0     ECHO 12/15/2023 LVSF 60-65%, aortic valve sclerosis.   ECHO 11/1/2024 showed LVEF 48%, global hypokinesis of LV. LV mildly dilated, abnormal thickness of mid apical LV wall, can not exclude hypertrophic cardiomyopathy. Pt was in Afib at the time of echo.  -- repeat scheduled for 6/23/2025  Nuclear Stress test 11/1/2024 showed -ve for ischemia, EF 36%, likely underestimated.  Zio patch showed Afib 25% and 2 short episodes of NSVT.   Cardiac Cath 12/2/2024 with Dr Sood showed no significant stentable CAD. No aortic stenosis.   EKG at the cardiologist's office on 10/9/2024 showed Afib RVR. Crestor was increased to 20mg, started on metoprolol and eliqus. Echo and stress Test was ordered. Referred to EP for ablation vs CV- pt reports that it will be in April sometime.  Following with cardio- Dr Sood and EP Dr Baldwin  C/w naila for stroke prevention  - c/w metoprolol XL for rate control         Cardiomyopathy, hypertrophic (Multi) I42.2     MRI Cardiac done 11/26/2024 showed apical hypertrophic cardiomyopathy. Focal hypertrophy of the distal anterolateral wall 1.7cm. Apical displacement of the papillary muscles. No aneurysm. Findings were consistent with fibrosis. LVSF 64%, RVEF 58%  C/w entreso, jardiance         Upper respiratory tract infection - Primary J06.9     Went on a cruise with his sister. Came back and she has flu A. Thinks he caught it as well. Started early this week. 2 nights ago had chills and sweats.   Will rule out flu, covid - this was -ve in the office today.  - start on augmentin 875mg BID x10 days  - start on xyzal 5mg nightly x7d  - start on mucinex 600mg BID x7d  - start on flonase nasal spray in both nostrils BID x7d         Relevant Medications    fluticasone (Flonase) 50 mcg/actuation  nasal spray    levocetirizine (Xyzal) 5 mg tablet    amoxicillin-pot clavulanate (Augmentin) 875-125 mg tablet     --------------------  Written by Rosa Morataya RN, acting as a scribe for Dr. Mckinnon. This note accurately reflects the work and decisions made by Dr. Mckinnon.     I, Dr. Mckinnon, attest all medical record entries made by the scribe were under my direction and were personally dictated by me. I have reviewed the chart and agree that the record accurately reflects my performance of the history, physical exam, and assessment and plan.

## 2025-02-28 ENCOUNTER — OFFICE VISIT (OUTPATIENT)
Dept: PRIMARY CARE | Facility: CLINIC | Age: 70
End: 2025-02-28
Payer: MEDICARE

## 2025-02-28 ENCOUNTER — APPOINTMENT (OUTPATIENT)
Dept: UROLOGY | Facility: CLINIC | Age: 70
End: 2025-02-28
Payer: MEDICARE

## 2025-02-28 VITALS
OXYGEN SATURATION: 92 % | HEIGHT: 70 IN | RESPIRATION RATE: 18 BRPM | WEIGHT: 226 LBS | HEART RATE: 55 BPM | DIASTOLIC BLOOD PRESSURE: 74 MMHG | BODY MASS INDEX: 32.35 KG/M2 | SYSTOLIC BLOOD PRESSURE: 125 MMHG

## 2025-02-28 DIAGNOSIS — Z00.00 ENCOUNTER FOR ANNUAL WELLNESS VISIT (AWV) IN MEDICARE PATIENT: ICD-10-CM

## 2025-02-28 DIAGNOSIS — I10 PRIMARY HYPERTENSION: ICD-10-CM

## 2025-02-28 DIAGNOSIS — J06.9 UPPER RESPIRATORY TRACT INFECTION, UNSPECIFIED TYPE: Primary | ICD-10-CM

## 2025-02-28 DIAGNOSIS — I42.2 CARDIOMYOPATHY, HYPERTROPHIC (MULTI): ICD-10-CM

## 2025-02-28 DIAGNOSIS — I48.0 PAROXYSMAL A-FIB (MULTI): ICD-10-CM

## 2025-02-28 DIAGNOSIS — E78.00 PURE HYPERCHOLESTEROLEMIA: ICD-10-CM

## 2025-02-28 DIAGNOSIS — K21.9 GASTROESOPHAGEAL REFLUX DISEASE WITHOUT ESOPHAGITIS: ICD-10-CM

## 2025-02-28 RX ORDER — FLUTICASONE PROPIONATE 50 MCG
1 SPRAY, SUSPENSION (ML) NASAL 2 TIMES DAILY
Qty: 16 G | Refills: 0 | Status: SHIPPED | OUTPATIENT
Start: 2025-02-28 | End: 2025-03-07

## 2025-02-28 RX ORDER — AMOXICILLIN AND CLAVULANATE POTASSIUM 875; 125 MG/1; MG/1
875 TABLET, FILM COATED ORAL 2 TIMES DAILY
Qty: 14 TABLET | Refills: 0 | Status: SHIPPED | OUTPATIENT
Start: 2025-02-28 | End: 2025-03-07

## 2025-02-28 RX ORDER — GUAIFENESIN 600 MG/1
600 TABLET, EXTENDED RELEASE ORAL 2 TIMES DAILY
Qty: 10 TABLET | Refills: 0 | Status: SHIPPED | OUTPATIENT
Start: 2025-02-28 | End: 2025-03-05

## 2025-02-28 RX ORDER — LEVOCETIRIZINE DIHYDROCHLORIDE 5 MG/1
5 TABLET, FILM COATED ORAL EVERY EVENING
Qty: 7 TABLET | Refills: 0 | Status: SHIPPED | OUTPATIENT
Start: 2025-02-28 | End: 2025-03-07

## 2025-02-28 ASSESSMENT — ENCOUNTER SYMPTOMS
FATIGUE: 1
RESPIRATORY NEGATIVE: 1
CARDIOVASCULAR NEGATIVE: 1
RHINORRHEA: 1
NEUROLOGICAL NEGATIVE: 1
MUSCULOSKELETAL NEGATIVE: 1
PSYCHIATRIC NEGATIVE: 1
EYES NEGATIVE: 1
ENDOCRINE NEGATIVE: 1
GASTROINTESTINAL NEGATIVE: 1

## 2025-02-28 ASSESSMENT — ACTIVITIES OF DAILY LIVING (ADL)
DOING_HOUSEWORK: INDEPENDENT
BATHING: INDEPENDENT
MANAGING_FINANCES: INDEPENDENT
DRESSING: INDEPENDENT
TAKING_MEDICATION: INDEPENDENT
GROCERY_SHOPPING: INDEPENDENT

## 2025-02-28 ASSESSMENT — PAIN SCALES - GENERAL: PAINLEVEL_OUTOF10: 2

## 2025-03-03 ENCOUNTER — APPOINTMENT (OUTPATIENT)
Dept: UROLOGY | Facility: CLINIC | Age: 70
End: 2025-03-03
Payer: MEDICARE

## 2025-03-03 DIAGNOSIS — R97.20 BPH WITH ELEVATED PSA: ICD-10-CM

## 2025-03-03 DIAGNOSIS — N40.0 BPH WITH ELEVATED PSA: ICD-10-CM

## 2025-03-03 DIAGNOSIS — N52.9 ERECTILE DYSFUNCTION, UNSPECIFIED ERECTILE DYSFUNCTION TYPE: ICD-10-CM

## 2025-03-03 DIAGNOSIS — D49.4 BLADDER TUMOR: Primary | ICD-10-CM

## 2025-03-03 LAB
POC APPEARANCE, URINE: CLEAR
POC BILIRUBIN, URINE: NEGATIVE
POC BLOOD, URINE: NEGATIVE
POC COLOR, URINE: YELLOW
POC GLUCOSE, URINE: ABNORMAL MG/DL
POC KETONES, URINE: NEGATIVE MG/DL
POC LEUKOCYTES, URINE: NEGATIVE
POC NITRITE,URINE: NEGATIVE
POC PH, URINE: 7 PH
POC PROTEIN, URINE: NEGATIVE MG/DL
POC SPECIFIC GRAVITY, URINE: 1.02
POC UROBILINOGEN, URINE: 1 EU/DL

## 2025-03-03 PROCEDURE — 52000 CYSTOURETHROSCOPY: CPT | Performed by: STUDENT IN AN ORGANIZED HEALTH CARE EDUCATION/TRAINING PROGRAM

## 2025-03-03 PROCEDURE — 99213 OFFICE O/P EST LOW 20 MIN: CPT | Performed by: STUDENT IN AN ORGANIZED HEALTH CARE EDUCATION/TRAINING PROGRAM

## 2025-03-03 RX ORDER — TADALAFIL 10 MG/1
10 TABLET ORAL DAILY PRN
Qty: 30 TABLET | Refills: 3 | Status: SHIPPED | OUTPATIENT
Start: 2025-03-03 | End: 2026-03-03

## 2025-03-03 RX ORDER — TADALAFIL 5 MG/1
5 TABLET ORAL DAILY
Qty: 90 TABLET | Refills: 3 | Status: SHIPPED | OUTPATIENT
Start: 2025-03-03 | End: 2026-03-03

## 2025-03-06 DIAGNOSIS — I48.0 PAROXYSMAL ATRIAL FIBRILLATION (MULTI): ICD-10-CM

## 2025-03-06 PROBLEM — J06.9 UPPER RESPIRATORY TRACT INFECTION: Status: ACTIVE | Noted: 2025-03-06

## 2025-03-06 NOTE — ASSESSMENT & PLAN NOTE
MRI Cardiac done 11/26/2024 showed apical hypertrophic cardiomyopathy. Focal hypertrophy of the distal anterolateral wall 1.7cm. Apical displacement of the papillary muscles. No aneurysm. Findings were consistent with fibrosis. LVSF 64%, RVEF 58%  C/w subhash porterdiance

## 2025-03-06 NOTE — ASSESSMENT & PLAN NOTE
- c/w statin, fenofibrate  - reviewed last lipid panel  - Patient educated and counseled to do walking and exercise regularly  - Low-fat low-cholesterol diet is advised  - Advised to reduce weight   - The goal is to keep the LDL less than 70, and HDL the more than 40

## 2025-03-06 NOTE — ASSESSMENT & PLAN NOTE
ECHO 12/15/2023 LVSF 60-65%, aortic valve sclerosis.   ECHO 11/1/2024 showed LVEF 48%, global hypokinesis of LV. LV mildly dilated, abnormal thickness of mid apical LV wall, can not exclude hypertrophic cardiomyopathy. Pt was in Afib at the time of echo.  -- repeat scheduled for 6/23/2025  Nuclear Stress test 11/1/2024 showed -ve for ischemia, EF 36%, likely underestimated.  Zio patch showed Afib 25% and 2 short episodes of NSVT.   Cardiac Cath 12/2/2024 with Dr Sood showed no significant stentable CAD. No aortic stenosis.   EKG at the cardiologist's office on 10/9/2024 showed Afib RVR. Crestor was increased to 20mg, started on metoprolol and eliqus. Echo and stress Test was ordered. Referred to EP for ablation vs CV- pt reports that it will be in April sometime.  Following with cardio- Dr Sood and EP Dr Baldwin  C/w naila for stroke prevention  - c/w metoprolol XL for rate control

## 2025-03-06 NOTE — ASSESSMENT & PLAN NOTE
Went on a cruise with his sister. Came back and she has flu A. Thinks he caught it as well. Started early this week. 2 nights ago had chills and sweats.   Will rule out flu, covid - this was -ve in the office today.  - start on augmentin 875mg BID x10 days  - start on xyzal 5mg nightly x7d  - start on mucinex 600mg BID x7d  - start on flonase nasal spray in both nostrils BID x7d

## 2025-03-07 DIAGNOSIS — I42.9 CARDIOMYOPATHY, UNSPECIFIED TYPE (MULTI): ICD-10-CM

## 2025-03-07 DIAGNOSIS — I10 PRIMARY HYPERTENSION: ICD-10-CM

## 2025-03-07 DIAGNOSIS — I48.0 PAROXYSMAL A-FIB (MULTI): ICD-10-CM

## 2025-03-07 DIAGNOSIS — I25.10 CORONARY ARTERY CALCIFICATION: ICD-10-CM

## 2025-03-07 DIAGNOSIS — G47.30 SLEEP APNEA, UNSPECIFIED TYPE: ICD-10-CM

## 2025-03-08 RX ORDER — SACUBITRIL AND VALSARTAN 24; 26 MG/1; MG/1
1 TABLET, FILM COATED ORAL 2 TIMES DAILY
Qty: 180 TABLET | Refills: 2 | Status: SHIPPED | OUTPATIENT
Start: 2025-03-08 | End: 2025-12-03

## 2025-03-10 DIAGNOSIS — I25.10 CORONARY ARTERY CALCIFICATION: ICD-10-CM

## 2025-03-10 DIAGNOSIS — I10 PRIMARY HYPERTENSION: ICD-10-CM

## 2025-03-10 DIAGNOSIS — G47.30 SLEEP APNEA, UNSPECIFIED TYPE: ICD-10-CM

## 2025-03-10 DIAGNOSIS — I48.0 PAROXYSMAL A-FIB (MULTI): ICD-10-CM

## 2025-03-10 DIAGNOSIS — I42.9 CARDIOMYOPATHY, UNSPECIFIED TYPE (MULTI): ICD-10-CM

## 2025-03-10 RX ORDER — SACUBITRIL AND VALSARTAN 24; 26 MG/1; MG/1
1 TABLET, FILM COATED ORAL 2 TIMES DAILY
Qty: 180 TABLET | Refills: 3 | Status: SHIPPED | OUTPATIENT
Start: 2025-03-10 | End: 2026-03-10

## 2025-03-25 ENCOUNTER — TELEPHONE (OUTPATIENT)
Dept: PRIMARY CARE | Facility: CLINIC | Age: 70
End: 2025-03-25
Payer: MEDICARE

## 2025-03-25 DIAGNOSIS — G47.30 SLEEP APNEA, UNSPECIFIED TYPE: ICD-10-CM

## 2025-03-25 NOTE — TELEPHONE ENCOUNTER
Pt needs repeat sleep study for [facility] apnea supplies last study was greater than 5 years ago

## 2025-03-26 DIAGNOSIS — G47.30 SLEEP APNEA, UNSPECIFIED TYPE: ICD-10-CM

## 2025-03-27 DIAGNOSIS — I25.10 CORONARY ARTERY CALCIFICATION: ICD-10-CM

## 2025-03-27 RX ORDER — EZETIMIBE 10 MG/1
10 TABLET ORAL DAILY
Qty: 90 TABLET | Refills: 3 | Status: SHIPPED | OUTPATIENT
Start: 2025-03-27 | End: 2026-03-27

## 2025-04-07 ENCOUNTER — CLINICAL SUPPORT (OUTPATIENT)
Dept: SLEEP MEDICINE | Facility: CLINIC | Age: 70
End: 2025-04-07
Payer: MEDICARE

## 2025-04-07 DIAGNOSIS — G47.61 PERIODIC LIMB MOVEMENT DISORDER: ICD-10-CM

## 2025-04-07 DIAGNOSIS — G47.30 SLEEP APNEA, UNSPECIFIED TYPE: ICD-10-CM

## 2025-04-07 DIAGNOSIS — I49.3 VENTRICULAR PREMATURE DEPOLARIZATION: ICD-10-CM

## 2025-04-07 DIAGNOSIS — G47.33 OBSTRUCTIVE SLEEP APNEA (ADULT) (PEDIATRIC): ICD-10-CM

## 2025-04-07 PROCEDURE — 95811 POLYSOM 6/>YRS CPAP 4/> PARM: CPT | Performed by: PSYCHIATRY & NEUROLOGY

## 2025-04-07 ASSESSMENT — PAIN SCALES - GENERAL: PAINLEVEL_OUTOF10: 0-NO PAIN

## 2025-04-08 VITALS
DIASTOLIC BLOOD PRESSURE: 73 MMHG | HEART RATE: 55 BPM | OXYGEN SATURATION: 99 % | RESPIRATION RATE: 14 BRPM | SYSTOLIC BLOOD PRESSURE: 136 MMHG

## 2025-04-08 ASSESSMENT — SLEEP AND FATIGUE QUESTIONNAIRES
ESS-CHAD TOTAL SCORE: 5
HOW LIKELY ARE YOU TO NOD OFF OR FALL ASLEEP IN A CAR, WHILE STOPPED FOR A FEW MINUTES IN TRAFFIC: WOULD NEVER DOZE
HOW LIKELY ARE YOU TO NOD OFF OR FALL ASLEEP WHILE SITTING AND TALKING TO SOMEONE: WOULD NEVER DOZE
HOW LIKELY ARE YOU TO NOD OFF OR FALL ASLEEP WHILE SITTING QUIETLY AFTER LUNCH WITHOUT ALCOHOL: SLIGHT CHANCE OF DOZING
HOW LIKELY ARE YOU TO NOD OFF OR FALL ASLEEP WHEN YOU ARE A PASSENGER IN A CAR FOR AN HOUR WITHOUT A BREAK: SLIGHT CHANCE OF DOZING
HOW LIKELY ARE YOU TO NOD OFF OR FALL ASLEEP WHILE LYING DOWN TO REST IN THE AFTERNOON WHEN CIRCUMSTANCES PERMIT: SLIGHT CHANCE OF DOZING
HOW LIKELY ARE YOU TO NOD OFF OR FALL ASLEEP WHILE WATCHING TV: SLIGHT CHANCE OF DOZING
HOW LIKELY ARE YOU TO NOD OFF OR FALL ASLEEP WHILE SITTING AND READING: SLIGHT CHANCE OF DOZING
SITING INACTIVE IN A PUBLIC PLACE LIKE A CLASS ROOM OR A MOVIE THEATER: WOULD NEVER DOZE

## 2025-04-08 NOTE — PROGRESS NOTES
[facility] TECH NOTE:     Patient: Van Latham   MRN//AGE: [id number]  1955  69 y.o.   Technologist: Mikaela Lopes RRT-SDS   Room: 107   Service Date: 2025        [facility]     TECHNOLOGIST SLEEP STUDY PROCEDURE NOTE:   This sleep study is being conducted according to the policies and procedures outlined by the AAS accreditation standards.  The sleep study procedure and processes involved during this appointment was explained to the patient/patient’s family, questions were answered. The patient/family verbalized understanding.      The patient is a 69 y.o. year old male scheduled for a Diagnostic PSG . he arrived for his appointment.      The study that was ultimately completed was a Diagnostic PSG Split night.    The full study Was completed.  Patient questionnaires completed?: yes     Consents signed? yes    Initial Fall Risk Screening:     Van has not fallen in the last 6 months. his did not result in injury. Van does not have a fear of falling. He does not need assistance with sitting, standing, or walking. he does not need assistance walking in his home. he does not need assistance in an unfamiliar setting. The patient is notusing an assistive device.     Brief Study observations: Patient qualified for a Split study tonight. Patient had Arousals of Respiratory, Spontaneous and Limb movements. Patients Respiratory events were Obstructive, Hypopneas and Flow Limitations. Patient had intermittent, mild to loud snoring. Patient was up 0 times throughout the night. Patient appeared to have tolerated CPAP. Patient did go into REM.     Deviation to order/protocol and reason:       If PAP, which was preferred mask/pressure/mode: [employer] Nova Micro Nasal Pillows- Large/ 9cmH2O- Cflex 3/ CPAP      Other:None    After the procedure, the patient/family was informed to ensure followup with ordering clinician for testing results.      Technologist: Mikaela Lopes RRT-SDS

## 2025-04-14 DIAGNOSIS — G47.33 OBSTRUCTIVE SLEEP APNEA SYNDROME: Primary | ICD-10-CM

## 2025-04-29 ENCOUNTER — OFFICE VISIT (OUTPATIENT)
Dept: SLEEP MEDICINE | Facility: CLINIC | Age: 70
End: 2025-04-29
Payer: MEDICARE

## 2025-04-29 VITALS
BODY MASS INDEX: 33.36 KG/M2 | OXYGEN SATURATION: 95 % | HEART RATE: 54 BPM | WEIGHT: 233 LBS | TEMPERATURE: 98.2 F | DIASTOLIC BLOOD PRESSURE: 76 MMHG | HEIGHT: 70 IN | SYSTOLIC BLOOD PRESSURE: 127 MMHG

## 2025-04-29 DIAGNOSIS — I48.0 PAROXYSMAL ATRIAL FIBRILLATION (MULTI): ICD-10-CM

## 2025-04-29 DIAGNOSIS — G47.33 OBSTRUCTIVE SLEEP APNEA SYNDROME: ICD-10-CM

## 2025-04-29 DIAGNOSIS — E66.811 OBESITY, CLASS I, BMI 30-34.9: Primary | ICD-10-CM

## 2025-04-29 DIAGNOSIS — E78.00 PURE HYPERCHOLESTEROLEMIA: ICD-10-CM

## 2025-04-29 PROCEDURE — 1036F TOBACCO NON-USER: CPT | Performed by: PHYSICIAN ASSISTANT

## 2025-04-29 PROCEDURE — 3008F BODY MASS INDEX DOCD: CPT | Performed by: PHYSICIAN ASSISTANT

## 2025-04-29 PROCEDURE — 99214 OFFICE O/P EST MOD 30 MIN: CPT | Performed by: PHYSICIAN ASSISTANT

## 2025-04-29 PROCEDURE — 1123F ACP DISCUSS/DSCN MKR DOCD: CPT | Performed by: PHYSICIAN ASSISTANT

## 2025-04-29 PROCEDURE — 3078F DIAST BP <80 MM HG: CPT | Performed by: PHYSICIAN ASSISTANT

## 2025-04-29 PROCEDURE — 3074F SYST BP LT 130 MM HG: CPT | Performed by: PHYSICIAN ASSISTANT

## 2025-04-29 PROCEDURE — 1126F AMNT PAIN NOTED NONE PRSNT: CPT | Performed by: PHYSICIAN ASSISTANT

## 2025-04-29 PROCEDURE — 1159F MED LIST DOCD IN RCRD: CPT | Performed by: PHYSICIAN ASSISTANT

## 2025-04-29 PROCEDURE — 99204 OFFICE O/P NEW MOD 45 MIN: CPT | Performed by: PHYSICIAN ASSISTANT

## 2025-04-29 PROCEDURE — G2211 COMPLEX E/M VISIT ADD ON: HCPCS | Performed by: PHYSICIAN ASSISTANT

## 2025-04-29 SDOH — ECONOMIC STABILITY: FOOD INSECURITY: WITHIN THE PAST 12 MONTHS, THE FOOD YOU BOUGHT JUST DIDN'T LAST AND YOU DIDN'T HAVE MONEY TO GET MORE.: NEVER TRUE

## 2025-04-29 SDOH — ECONOMIC STABILITY: FOOD INSECURITY: WITHIN THE PAST 12 MONTHS, YOU WORRIED THAT YOUR FOOD WOULD RUN OUT BEFORE YOU GOT MONEY TO BUY MORE.: NEVER TRUE

## 2025-04-29 ASSESSMENT — LIFESTYLE VARIABLES
AUDIT-C TOTAL SCORE: 1
HOW MANY STANDARD DRINKS CONTAINING ALCOHOL DO YOU HAVE ON A TYPICAL DAY: 1 OR 2
HOW OFTEN DO YOU HAVE A DRINK CONTAINING ALCOHOL: MONTHLY OR LESS
SKIP TO QUESTIONS 9-10: 1
HOW OFTEN DO YOU HAVE SIX OR MORE DRINKS ON ONE OCCASION: NEVER

## 2025-04-29 ASSESSMENT — ENCOUNTER SYMPTOMS
OCCASIONAL FEELINGS OF UNSTEADINESS: 0
LOSS OF SENSATION IN FEET: 0
DEPRESSION: 0

## 2025-04-29 ASSESSMENT — PAIN SCALES - GENERAL: PAINLEVEL_OUTOF10: 0-NO PAIN

## 2025-04-29 NOTE — PROGRESS NOTES
Patient: Van Latham    [id number]  : 1955 -- AGE 69 y.o.    Provider: Erica Tovar PA-C     Location [facility]   Service Date: 2025              [facility]  New Visit Note      HISTORY OF PRESENT ILLNESS     The patient's referring provider is: Iraj Merritt MD; Iraj Merritt MD    HISTORY OF PRESENT ILLNESS   Van Latham is a 69 y.o. male with h/o MIREYA, afib, cardiomyopathy who presents to a [facility] for a sleep medicine evaluation with concerns of No chief complaint on file..       Past Sleep History  diagnosed in ?   Updated sleep study, 2025 indictees   RDI3% ~52  RDI4% ~30   O2 som 89%       Current History    On today's visit, the patient reports he is a long term PAP user, diagnosed about 10 years ago. Unsure why he is on BPAP over CPAP but did very well with CPAP in sleep study, reviewed with him, he tolerated well and willint to use cpap. Using a recalled Donna device, tried to submit the paper work, never received a replacements, wants updated device through insurance. H    MIREYA symptoms, prior to PAP therapy - snoring, trouble with sleep maintenance, pauses in breathing, daytime sleepiness --> all resolved with PAP theray.    Denies RLS. Denies dream enactment, sleep walking or other parasomnias. Denies insomnia.       Sleep schedule:  In bed: 10-11P  Subjective sleep latency:  10 minutes   Awakenings during night:  none  Final awakening time:  6-7A  Naps: none     Overall estimate of total sleep time: 7-8   Weekends/Days off: same     Preferred sleeping position: SLEEP POSITION: sidelying      ESS:  2  RADHA:  1  FOSQ: d40      REVIEW OF SYSTEMS     REVIEW OF SYSTEMS  See HPI; all other ROS were reviewed and negative for compliant        ALLERGIES AND MEDICATIONS     ALLERGIES  Allergies[1]    MEDICATIONS  Current Medications[2]      PAST HISTORY     PAST MEDICAL HISTORY  He  has a  From: Any Santamaria  To: Scot Lopez  Sent: 5/14/2023 5:19 PM CDT  Subject: Moira on Behalf of My Mom    Hi Dr. Lopez, My mom is continuing to have a lot of sharp abdominal pain with any solid food. It’s concerning that this hasn’t been improving. I know she’s been told to let things take it’s course, but at what point should there be any additional work-up? I know she is worried about the large intestine inflammation … please feel free to reach out to me directly. [phone]. I appreciate you reading. Moira   past medical history of Acute bronchitis (12/12/2023), Acute laryngitis (12/12/2023), Acute lower UTI (12/12/2023), Acute pharyngitis (12/12/2023), Arthritis (1/15/2020), Body mass index (BMI) 33.0-33.9, adult (05/17/2021), Body mass index (BMI) 33.0-33.9, adult (11/11/2019), Chest pressure (12/12/2023), Daytime somnolence (12/12/2023), Fatigue (12/12/2023), GERD (gastroesophageal reflux disease) (6/1/2013), Hypercholesteremia (12/12/2023), Hypertension (6/1/2016), Occult blood in stools (12/12/2023), Pain of left heel (12/12/2023), Pure hypercholesterolemia, unspecified (12/13/2022), Pure hyperglyceridemia, Snoring (12/12/2023), and Varicella (12/15/1961).    PAST SURGICAL HISTORY:  Surgical History[3]    FAMILY HISTORY  Family History[4]    He does have a family history of sleep disorder. Son has sleep apnea.     SOCIAL HISTORY  He  reports that he has never smoked. He has never used smokeless tobacco. He reports that he does not currently use alcohol after a past usage of about 2.0 standard drinks of alcohol per week. He reports that he does not use drugs. He    Caffeine consumption: Yes, Patient consumes caffeine beverage regularly, about 1-2 cup(s)/day.  Alcohol consumption: Yes, Patient consumes alcohol only on special occasions.  Marijuana: No      PHYSICAL EXAM     VITAL SIGNS: There were no vitals taken for this visit.     CURRENT WEIGHT: There were no vitals filed for this visit.  There is no height or weight on file to calculate BMI.  PREVIOUS WEIGHTS:  Wt Readings from Last 3 Encounters:   02/28/25 103 kg (226 lb)   12/18/24 101 kg (222 lb)   11/26/24 102 kg (224 lb)       Constitutional: Alert and oriented, cooperative, no acute distress  Head: Normocephalic, atraumatic   Cranial Features: No abnormal craniofacial features     Upper Airway Examination:  Mallampati Class: 3  Tonsil Grade: not visualized   Tongue Scalloping: not noted   Uvula: midline    Neck: Supple. Trachea midline.  Pulmonary:  Non-labored breathing, speaks in full sentences.   Cardiac: regular rate   Extremities: No clubbing, no edema  Neuromuscular: Cranial nerves grossly intact, no focal deficits      RESULTS/DATA     Bicarbonate (mmol/L)   Date Value   11/11/2024 23   12/13/2023 28   12/13/2022 26   08/12/2022 25   03/07/2022 27             ASSESSMENT/PLAN     Mr. Latham is a 69 y.o. male and He was referred to the [facility] for evaluation of MIREYA    Problem List, Orders, Assessment, Recommendations:  Problem List Items Addressed This Visit           ICD-10-CM    Obstructive sleep apnea syndrome G47.33    -previous bpap user for unclear reasons, does not have pulmonary diseeae or sign of hypoventilation- reviewed recent bicarb  -did well with cpap on split night study, tolerated well  -agrees to cpap use; would like to go with [facility]; order sent  -wants to use F&P micronova pillows as he did on sleep study; will request this  -avoid drowsy driving          Relevant Orders    Positive Airway Pressure (PAP) Therapy    Obesity, Class I, BMI 30-34.9 - Primary E66.811    -encourage healthy diet and exercise  -using Stationary bike, active - dog walking multiple times per day  -discussed relationship to MIREYA         Paroxysmal atrial fibrillation (Multi) I48.0    Afib, HTN   -hr regular, bp well controlled  -continue regular cpap use                    Disposition    Return to clinic in 3 months           [1] No Known Allergies  [2]   Current Outpatient Medications   Medication Sig Dispense Refill    apixaban (Eliquis) 5 mg tablet Take 1 tablet (5 mg) by mouth 2 times a day. 180 tablet 3    empagliflozin (Jardiance) 10 mg Take 1 tablet (10 mg) by mouth once daily. 90 tablet 3    ezetimibe (Zetia) 10 mg tablet Take 1 tablet (10 mg) by mouth once daily. 90 tablet 3    fenofibrate (Tricor) 145 mg tablet Take 1 tablet (145 mg) by mouth once daily. 90 tablet 0    fluticasone (Flonase) 50 mcg/actuation nasal spray  Administer 1 spray into each nostril 2 times a day for 7 days. Shake gently. Before first use, prime pump. After use, clean tip and replace cap. 16 g 0    levocetirizine (Xyzal) 5 mg tablet Take 1 tablet (5 mg) by mouth once daily in the evening for 7 days. 7 tablet 0    meloxicam (Mobic) 15 mg tablet Take 1 tablet (15 mg) by mouth once daily as needed for mild pain (1 - 3). 90 tablet 0    metoprolol succinate XL (Toprol-XL) 50 mg 24 hr tablet Take 1 tablet (50 mg) by mouth once daily. Do not crush or chew. 90 tablet 3    omeprazole (PriLOSEC) 20 mg DR capsule TAKE 1 CAPSULE DAILY 90 capsule 0    rosuvastatin (Crestor) 20 mg tablet Take 1 tablet (20 mg) by mouth once daily. 90 tablet 3    sacubitriL-valsartan (Entresto) 24-26 mg tablet Take 1 tablet by mouth 2 times a day. 180 tablet 3    sildenafil (Viagra) 100 mg tablet Take 1 tablet (100 mg) by mouth if needed for erectile dysfunction. 90 tablet 0    tadalafil (Cialis) 10 mg tablet Take 1 tablet (10 mg) by mouth once daily as needed for erectile dysfunction. 30 tablet 3    tadalafil (Cialis) 5 mg tablet Take 1 tablet (5 mg) by mouth once daily. 90 tablet 3     No current facility-administered medications for this visit.   [3]   Past Surgical History:  Procedure Laterality Date    CARDIAC CATHETERIZATION N/A 12/2/2024    Procedure: Left Heart Cath;  Surgeon: Jean Avila MD;  Location: [facility];  Service: Cardiovascular;  Laterality: N/A;    CIRCUMCISION, PRIMARY  1956    FRACTURE SURGERY  8/15/1963    PROSTATE SURGERY  08/2022    VASECTOMY  7/1/1986   [4]   Family History  Problem Relation Name Age of Onset    Other (passed of heart attack) Father Prashanth     Hypertension Father Prashanth     Heart attack Father Prashanth     Cancer Sister Sade     Miscarriages / Stillbirths Mother Saritha     Hypertension Brother Jeffrey     Intellectual Disability Brother Jonathan

## 2025-04-29 NOTE — ASSESSMENT & PLAN NOTE
-encourage healthy diet and exercise  -using Stationary bike, active - dog walking multiple times per day  -discussed relationship to MIREYA

## 2025-04-29 NOTE — ASSESSMENT & PLAN NOTE
-previous bpap user for unclear reasons, does not have pulmonary diseeae or sign of hypoventilation- reviewed recent bicarb  -did well with cpap on split night study, tolerated well  -agrees to cpap use; would like to go with [facility]; order sent  -wants to use F&P micronova pillows as he did on sleep study; will request this  -avoid drowsy driving

## 2025-04-30 RX ORDER — FENOFIBRATE 145 MG/1
145 TABLET, FILM COATED ORAL DAILY
Qty: 90 TABLET | Refills: 0 | Status: SHIPPED | OUTPATIENT
Start: 2025-04-30

## 2025-05-16 ENCOUNTER — PRE-ADMISSION TESTING (OUTPATIENT)
Dept: PREADMISSION TESTING | Facility: HOSPITAL | Age: 70
End: 2025-05-16
Payer: MEDICARE

## 2025-05-16 VITALS
HEART RATE: 55 BPM | WEIGHT: 230 LBS | HEIGHT: 70 IN | TEMPERATURE: 97 F | SYSTOLIC BLOOD PRESSURE: 140 MMHG | BODY MASS INDEX: 32.93 KG/M2 | OXYGEN SATURATION: 97 % | DIASTOLIC BLOOD PRESSURE: 69 MMHG | RESPIRATION RATE: 16 BRPM

## 2025-05-16 DIAGNOSIS — Z01.818 PREOP TESTING: Primary | ICD-10-CM

## 2025-05-16 DIAGNOSIS — Z51.89 ENCOUNTER FOR OTHER SPECIFIED AFTERCARE: ICD-10-CM

## 2025-05-16 LAB
ABO GROUP (TYPE) IN BLOOD: NORMAL
ANION GAP SERPL CALCULATED.3IONS-SCNC: 12 MMOL/L (ref 10–20)
ANTIBODY SCREEN: NORMAL
APTT PPP: 30 SECONDS (ref 22–32.5)
BASOPHILS # BLD AUTO: 0.07 X10*3/UL (ref 0–0.1)
BASOPHILS NFR BLD AUTO: 1.1 %
BUN SERPL-MCNC: 33 MG/DL (ref 6–23)
CALCIUM SERPL-MCNC: 10.3 MG/DL (ref 8.6–10.3)
CHLORIDE SERPL-SCNC: 107 MMOL/L (ref 98–107)
CO2 SERPL-SCNC: 24 MMOL/L (ref 21–32)
CREAT SERPL-MCNC: 1.83 MG/DL (ref 0.5–1.3)
EGFRCR SERPLBLD CKD-EPI 2021: 40 ML/MIN/1.73M*2
EOSINOPHIL # BLD AUTO: 0.34 X10*3/UL (ref 0–0.7)
EOSINOPHIL NFR BLD AUTO: 5.4 %
ERYTHROCYTE [DISTWIDTH] IN BLOOD BY AUTOMATED COUNT: 14.3 % (ref 11.5–14.5)
GLUCOSE SERPL-MCNC: 110 MG/DL (ref 74–99)
HCT VFR BLD AUTO: 50.3 % (ref 41–52)
HGB BLD-MCNC: 16.4 G/DL (ref 13.5–17.5)
IMM GRANULOCYTES # BLD AUTO: 0.01 X10*3/UL (ref 0–0.7)
IMM GRANULOCYTES NFR BLD AUTO: 0.2 % (ref 0–0.9)
INR PPP: 1.2 (ref 0.9–1.2)
LYMPHOCYTES # BLD AUTO: 1.41 X10*3/UL (ref 1.2–4.8)
LYMPHOCYTES NFR BLD AUTO: 22.5 %
MCH RBC QN AUTO: 29 PG (ref 26–34)
MCHC RBC AUTO-ENTMCNC: 32.6 G/DL (ref 32–36)
MCV RBC AUTO: 89 FL (ref 80–100)
MONOCYTES # BLD AUTO: 0.65 X10*3/UL (ref 0.1–1)
MONOCYTES NFR BLD AUTO: 10.4 %
NEUTROPHILS # BLD AUTO: 3.78 X10*3/UL (ref 1.2–7.7)
NEUTROPHILS NFR BLD AUTO: 60.4 %
NRBC BLD-RTO: 0 /100 WBCS (ref 0–0)
PLATELET # BLD AUTO: 192 X10*3/UL (ref 150–450)
POTASSIUM SERPL-SCNC: 4.4 MMOL/L (ref 3.5–5.3)
PROTHROMBIN TIME: 12.4 SECONDS (ref 9.3–12.7)
RBC # BLD AUTO: 5.66 X10*6/UL (ref 4.5–5.9)
RH FACTOR (ANTIGEN D): NORMAL
SODIUM SERPL-SCNC: 139 MMOL/L (ref 136–145)
WBC # BLD AUTO: 6.3 X10*3/UL (ref 4.4–11.3)

## 2025-05-16 PROCEDURE — 85025 COMPLETE CBC W/AUTO DIFF WBC: CPT

## 2025-05-16 PROCEDURE — 82374 ASSAY BLOOD CARBON DIOXIDE: CPT

## 2025-05-16 PROCEDURE — 99204 OFFICE O/P NEW MOD 45 MIN: CPT | Performed by: NURSE PRACTITIONER

## 2025-05-16 PROCEDURE — 36415 COLL VENOUS BLD VENIPUNCTURE: CPT

## 2025-05-16 PROCEDURE — 85610 PROTHROMBIN TIME: CPT

## 2025-05-16 PROCEDURE — 86901 BLOOD TYPING SEROLOGIC RH(D): CPT

## 2025-05-16 ASSESSMENT — ENCOUNTER SYMPTOMS
FATIGUE: 1
GASTROINTESTINAL NEGATIVE: 1
CARDIOVASCULAR NEGATIVE: 1
ENDOCRINE NEGATIVE: 1
PSYCHIATRIC NEGATIVE: 1
SHORTNESS OF BREATH: 1
ARTHRALGIAS: 1
HEMATOLOGIC/LYMPHATIC NEGATIVE: 1
LIGHT-HEADEDNESS: 1
EYES NEGATIVE: 1

## 2025-05-16 ASSESSMENT — DUKE ACTIVITY SCORE INDEX (DASI)
CAN YOU PARTICIPATE IN STRENOUS SPORTS LIKE SWIMMING, SINGLES TENNIS, FOOTBALL, BASKETBALL, OR SKIING: NO
TOTAL_SCORE: 42.7
CAN YOU HAVE SEXUAL RELATIONS: YES
DASI METS SCORE: 8
CAN YOU PARTICIPATE IN MODERATE RECREATIONAL ACTIVITIES LIKE GOLF, BOWLING, DANCING, DOUBLES TENNIS OR THROWING A BASEBALL OR FOOTBALL: YES
CAN YOU CLIMB A FLIGHT OF STAIRS OR WALK UP A HILL: YES
CAN YOU WALK A BLOCK OR TWO ON LEVEL GROUND: YES
CAN YOU DO LIGHT WORK AROUND THE HOUSE LIKE DUSTING OR WASHING DISHES: YES
CAN YOU DO MODERATE WORK AROUND THE HOUSE LIKE VACUUMING, SWEEPING FLOORS OR CARRYING GROCERIES: YES
CAN YOU DO HEAVY WORK AROUND THE HOUSE LIKE SCRUBBING FLOORS OR LIFTING AND MOVING HEAVY FURNITURE: YES
CAN YOU WALK INDOORS, SUCH AS AROUND YOUR HOUSE: YES
CAN YOU DO YARD WORK LIKE RAKING LEAVES, WEEDING OR PUSHING A MOWER: YES
CAN YOU TAKE CARE OF YOURSELF (EAT, DRESS, BATHE, OR USE TOILET): YES
CAN YOU RUN A SHORT DISTANCE: NO

## 2025-05-16 NOTE — NURSING NOTE
Patient seen in [facility] this morning. Patient on Entresto and Jardiance for cardiac history. Secure message sent to Dr. Dorsey along with our provider to clarify if he is okay to hold medications per anesthesia guidelines for upcoming surgery. Patient may hold the Jardiance 3 days before and Entresto (due to Valsartan) for 24 hours before per surgeon. Attempt to call patient with no answer. Will retry shortly.

## 2025-05-16 NOTE — PREPROCEDURE INSTRUCTIONS
Preoperative Fasting Guidelines    Why must I stop eating and drinking near surgery time?  With sedation, food or liquid in your stomach can enter your lungs causing serious complications  Increases nausea and vomiting    When do I need to stop eating and drinking before my surgery?  Do not eat any food after midnight the night before your surgery/procedure.  You may have up to 13.5 ounces of clear liquid until TWO hours before your instructed arrival time to the hospital.  This includes water, black tea/coffee, (no milk or cream) apple juice, and electrolyte drinks (Gatorade)  You may chew gum until TWO hours before your surgery/procedure      PAT DISCHARGE INSTRUCTIONS    Please call the Same Day Surgery (SDS) Department of the hospital where your procedure will be performed after 2:00 PM the day before your surgery. If you are scheduled on a Monday, or a Tuesday following a Monday holiday, you will need to call on the last business day prior to your surgery.      [address], [facility]  [phone]  Second Floor      Please let your surgeon know if:      You develop any open sores, shingles, burning or painful urination as these may increase your risk of an infection.   You no longer wish to have the surgery.   Any other personal circumstances change that may lead to the need to cancel or defer this surgery-such as being sick or getting admitted to any hospital within one week of your planned procedure.    Your contact details change, such as a change of address or phone number.    Starting now:     Please DO NOT drink alcohol or smoke for 24 hours before surgery. It is well known that quitting smoking can make a huge difference to your health and recovery from surgery. The longer you abstain from smoking, the better your chances of a healthy recovery. If you need help with quitting, call 4-908-QUIT-NOW to be connected to a trained counselor  who will discuss the best methods to help you quit.     Before your surgery:    Please stop all supplements 7 days prior to surgery. Or as directed by your surgeon.   Please stop taking NSAID pain medicine such as Advil and Motrin 7 days before surgery.    If you develop any fever, cough, cold, rashes, cuts, scratches, scrapes, urinary symptoms or infection anywhere on your body (including teeth and gums) prior to surgery, please call your surgeon’s office as soon as possible. This may require treatment to reduce the chance of cancellation on the day of surgery.    The day before your surgery:   DIET- Please follow the diet instructions at the top of your packet.   Get a good night’s rest.  Use the special soap for bathing if you have been instructed to use one.    Scheduled surgery times may change and you will be notified if this occurs - please check your personal voicemail for any updates.     On the morning of surgery:   Wear comfortable, loose fitting clothes which open in the front. Please do not wear moisturizers, creams, lotions, makeup or perfume.    Please bring with you to surgery:   Photo ID and insurance card   Current list of medicines and allergies   Pacemaker/ Defibrillator/Heart stent cards   CPAP machine and mask    Slings/ splints/ crutches   A copy of your complete advanced directive/DHPOA.    Please do NOT bring with you to surgery:   All jewelry and valuables should be left at home.   Prosthetic devices such as contact lenses, hearing aids, dentures, eyelash extensions, hairpins and body piercings must be removed prior to going in to the surgical suite.    After outpatient surgery:   A responsible adult MUST accompany you at the time of discharge and stay with you for 24 hours after your surgery. You may NOT drive yourself home after surgery.    Do not drive, operate machinery, make critical decisions or do activities that require co-ordination or balance until after a night’s sleep.   Do not  drink alcoholic beverages for 24 hours.   Instructions for resuming your medications will be provided by your surgeon.    CALL YOUR DOCTOR AFTER SURGERY IF YOU HAVE:     Chills and/or a fever of 101° F or higher.    Redness, swelling, pus or drainage from your surgical wound or a bad smell from the wound.    Lightheadedness, fainting or confusion.    Persistent vomiting (throwing up) and are not able to eat or drink for 12 hours.    Three or more loose, watery bowel movements in 24 hours (diarrhea).   Difficulty or pain while urinating( after non-urological surgery)    Pain and swelling in your legs, especially if it is only on one side.    Difficulty breathing or are breathing faster than normal.    Any new concerning symptoms.         Medication List            Accurate as of May 16, 2025  9:33 AM. Always use your most recent med list.                apixaban 5 mg tablet  Commonly known as: Eliquis  Take 1 tablet (5 mg) by mouth 2 times a day.  Medication Adjustments for Surgery: Do Not take on the morning of surgery  Notes to patient: TAKE THE LAST DOSE 5/22 MORNING, THEN HOLD FOR SURGERY.     empagliflozin 10 mg tablet  Commonly known as: Jardiance  Take 1 tablet (10 mg) by mouth once daily.  Notes to patient: WE WILL CONTACT YOU WHEN WE HEAR FROM DR JOHN     ezetimibe 10 mg tablet  Commonly known as: Zetia  Take 1 tablet (10 mg) by mouth once daily.  Medication Adjustments for Surgery: Take last dose 1 day (24 hours) before surgery     fenofibrate 145 mg tablet  Commonly known as: Tricor  TAKE 1 TABLET ONCE DAILY  Medication Adjustments for Surgery: Take last dose 1 day (24 hours) before surgery     fluticasone 50 mcg/actuation nasal spray  Commonly known as: Flonase  Administer 1 spray into each nostril 2 times a day for 7 days. Shake gently. Before first use, prime pump. After use, clean tip and replace cap.  Notes to patient: NOT TAKING     levocetirizine 5 mg tablet  Commonly known as: Xyzal  Take 1  tablet (5 mg) by mouth once daily in the evening for 7 days.  Notes to patient: NOT TAKING     meloxicam 15 mg tablet  Commonly known as: Mobic  Take 1 tablet (15 mg) by mouth once daily as needed for mild pain (1 - 3).  Additional Medication Adjustments for Surgery: Take last dose 7 days before surgery     metoprolol succinate XL 50 mg 24 hr tablet  Commonly known as: Toprol-XL  Take 1 tablet (50 mg) by mouth once daily. Do not crush or chew.  Medication Adjustments for Surgery: Take on the morning of surgery     omeprazole 20 mg DR capsule  Commonly known as: PriLOSEC  TAKE 1 CAPSULE DAILY  Medication Adjustments for Surgery: Take on the morning of surgery     rosuvastatin 20 mg tablet  Commonly known as: Crestor  Take 1 tablet (20 mg) by mouth once daily.  Medication Adjustments for Surgery: Take on the morning of surgery     sacubitriL-valsartan 24-26 mg tablet  Commonly known as: Entresto  Take 1 tablet by mouth 2 times a day.  Notes to patient: WE WILL CONTACT YOU WHEN WE HEAR FROM DR JOHN     sildenafil 100 mg tablet  Commonly known as: Viagra  Take 1 tablet (100 mg) by mouth if needed for erectile dysfunction.  Medication Adjustments for Surgery: Take last dose 3 days before surgery     * tadalafil 10 mg tablet  Commonly known as: Cialis  Take 1 tablet (10 mg) by mouth once daily as needed for erectile dysfunction.  Medication Adjustments for Surgery: Take last dose 3 days before surgery     * tadalafil 5 mg tablet  Commonly known as: Cialis  Take 1 tablet (5 mg) by mouth once daily.  Medication Adjustments for Surgery: Take last dose 3 days before surgery           * This list has 2 medication(s) that are the same as other medications prescribed for you. Read the directions carefully, and ask your doctor or other care provider to review them with you.

## 2025-05-16 NOTE — CPM/PAT H&P
CPM/PAT Evaluation       Name: Van Latham (Van Latham)  /Age: 1955/69 y.o.     In-Person       Chief Complaint: paroxsymal atrial fibrillation    HPI    Pt is a 69 year old male with paroxsymal atrial fibrillation. Pt has hypertrophic cardiomyopathy, HTN,  HLD, and MIREYA on CPAP. Pt was referred by his cardiologist Dr Sood to follow up with an electrophysiologist. Pt reports he does experience mild fatigue and SOB when he is in Atrial fibrillation. Pt stated he wears a smart watch and can tell when he is in Afib. Pt was recently diagnosed with hypertrophic cardiomyopathy. Pt's father suddenly  in his 50's from an MI and an enlarged heart.   Pt was examined by his electrophysiologist and has been scheduled for paroxsymal atrial fibrillation ablation. Pt denies CP.     Past Medical History:   Diagnosis Date    Acute lower UTI 2023    Acute pharyngitis 2023    Arthritis 1/15/2020    Benign prostatic hyperplasia 12/15/2021    Body mass index (BMI) 33.0-33.9, adult 2021    Body mass index (BMI) of 33.0 to 33.9 in adult    Chest pressure 2023    Daytime somnolence 2023    Elevated PSA 12/15/2021    Erectile dysfunction 1995    Fatigue 2023    GERD (gastroesophageal reflux disease) 2013    Hypercholesteremia 2023    Hypertension 2016    Hypertrophic cardiomyopathy (Multi)     Occult blood in stools 2023    Osteoarthritis     bilateral knees    Pain of left heel 2023    Pure hypercholesterolemia, unspecified 2022    Hypercholesteremia    Pure hyperglyceridemia     Hypertriglyceridemia    Sleep apnea 2009    Snoring 2023    Varicella 12/15/1961       Past Surgical History:   Procedure Laterality Date    CARDIAC CATHETERIZATION N/A 2024    Procedure: Left Heart Cath;  Surgeon: Jean Avila MD;  Location: [facility];  Service: Cardiovascular;  Laterality: N/A;    CIRCUMCISION, PRIMARY  1956    FRACTURE  SURGERY  8/15/1963    Multiple fracture repairs    PROSTATE SURGERY  08/2022    for BPH    VASECTOMY  7/1/1986     Social History     Tobacco Use    Smoking status: Never    Smokeless tobacco: Never   Substance Use Topics    Alcohol use: Not Currently     Comment: rare     Social History     Substance and Sexual Activity   Drug Use Never     Patient  reports being sexually active and has had partner(s) who are female. He reports using the following method of birth control/protection: None.    Family History[1]    Allergies[2]    Current Outpatient Medications   Medication Sig Dispense Refill    apixaban (Eliquis) 5 mg tablet Take 1 tablet (5 mg) by mouth 2 times a day. 180 tablet 3    empagliflozin (Jardiance) 10 mg Take 1 tablet (10 mg) by mouth once daily. 90 tablet 3    ezetimibe (Zetia) 10 mg tablet Take 1 tablet (10 mg) by mouth once daily. 90 tablet 3    fenofibrate (Tricor) 145 mg tablet TAKE 1 TABLET ONCE DAILY 90 tablet 0       0       0    meloxicam (Mobic) 15 mg tablet Take 1 tablet (15 mg) by mouth once daily as needed for mild pain (1 - 3). 90 tablet 0    metoprolol succinate XL (Toprol-XL) 50 mg 24 hr tablet Take 1 tablet (50 mg) by mouth once daily. Do not crush or chew. 90 tablet 3    omeprazole (PriLOSEC) 20 mg DR capsule TAKE 1 CAPSULE DAILY 90 capsule 0    rosuvastatin (Crestor) 20 mg tablet Take 1 tablet (20 mg) by mouth once daily. 90 tablet 3    sacubitriL-valsartan (Entresto) 24-26 mg tablet Take 1 tablet by mouth 2 times a day. 180 tablet 3    sildenafil (Viagra) 100 mg tablet Take 1 tablet (100 mg) by mouth if needed for erectile dysfunction. 90 tablet 0    tadalafil (Cialis) 10 mg tablet Take 1 tablet (10 mg) by mouth once daily as needed for erectile dysfunction. 30 tablet 3    tadalafil (Cialis) 5 mg tablet Take 1 tablet (5 mg) by mouth once daily. 90 tablet 3     No current facility-administered medications for this visit.     Review of Systems   Constitutional:  Positive for fatigue  (experiences fatigue when in Afib).   HENT: Negative.     Eyes: Negative.    Respiratory:  Positive for shortness of breath (intermittent SOB when in Afib).    Cardiovascular: Negative.    Gastrointestinal: Negative.    Endocrine: Negative.    Genitourinary: Negative.    Musculoskeletal:  Positive for arthralgias (bilateral knee osteoarthritis).   Skin: Negative.    Neurological:  Positive for light-headedness (occasional light-headedness with rapid changes in position).   Hematological: Negative.    Psychiatric/Behavioral: Negative.       Physical Exam  Vitals reviewed.   Constitutional:       Appearance: Normal appearance.   HENT:      Head: Normocephalic and atraumatic.      Nose: Nose normal.      Mouth/Throat:      Mouth: Mucous membranes are moist.      Pharynx: Oropharynx is clear.   Eyes:      Extraocular Movements: Extraocular movements intact.      Conjunctiva/sclera: Conjunctivae normal.      Pupils: Pupils are equal, round, and reactive to light.   Cardiovascular:      Rate and Rhythm: Normal rate and regular rhythm.      Pulses: Normal pulses.      Heart sounds: Normal heart sounds.   Pulmonary:      Effort: Pulmonary effort is normal. No respiratory distress.      Breath sounds: Normal breath sounds. No wheezing, rhonchi or rales.   Abdominal:      General: Bowel sounds are normal.      Palpations: Abdomen is soft.   Musculoskeletal:         General: Normal range of motion.      Cervical back: Normal range of motion and neck supple.   Skin:     General: Skin is warm and dry.   Neurological:      General: No focal deficit present.      Mental Status: He is alert and oriented to person, place, and time. Mental status is at baseline.   Psychiatric:         Mood and Affect: Mood normal.         Behavior: Behavior normal.         Thought Content: Thought content normal.         Judgment: Judgment normal.      PAT AIRWAY:   Airway:     Mallampati::  II    TM distance::  >3 FB    Neck ROM::  Full  normal   "    Visit Vitals  /69   Pulse 55   Temp 36.1 °C (97 °F) (Temporal)   Resp 16   Ht 1.778 m (5' 10\")   Wt 104 kg (230 lb)   SpO2 97%   BMI 33.00 kg/m²   Smoking Status Never   BSA 2.27 m²     ASA: 3  CHADS: 4%  RCRI: 0.9%  Ariscat: 1.6%  DASI Risk Score      Flowsheet Row Pre-Admission Testing from 5/16/2025 in [facility] [place]   Can you take care of yourself (eat, dress, bathe, or use toilet)?  2.75 filed at 05/16/2025 0917   Can you walk indoors, such as around your house? 1.75 filed at 05/16/2025 0917   Can you walk a block or two on level ground?  2.75 filed at 05/16/2025 0917   Can you climb a flight of stairs or walk up a hill? 5.5 filed at 05/16/2025 0917   Can you run a short distance? 0 filed at 05/16/2025 0917   Can you do light work around the house like dusting or washing dishes? 2.7 filed at 05/16/2025 0917   Can you do moderate work around the house like vacuuming, sweeping floors or carrying groceries? 3.5 filed at 05/16/2025 0917   Can you do heavy work around the house like scrubbing floors or lifting and moving heavy furniture?  8 filed at 05/16/2025 0917   Can you do yard work like raking leaves, weeding or pushing a mower? 4.5 filed at 05/16/2025 0917   Can you have sexual relations? 5.25 filed at 05/16/2025 0917   Can you participate in moderate recreational activities like golf, bowling, dancing, doubles tennis or throwing a baseball or football? 6 filed at 05/16/2025 0917   Can you participate in strenous sports like swimming, singles tennis, football, basketball, or skiing? 0 filed at 05/16/2025 0917   DASI SCORE 42.7 filed at 05/16/2025 0917   METS Score (Will be calculated only when all the questions are answered) 8 filed at 05/16/2025 0917          Caprini DVT Assessment    No data to display       Modified Frailty Index    No data to display       [id number] Stroke Risk Points  Current as of just now        2 0 to 9 Points:      No Change          The TLF7US4-AGEf risk " score (Lip GH, et al. 2009. © 2010 [employer]) quantifies the risk of stroke for a patient with atrial fibrillation. For patients without atrial fibrillation or under the age of 18 this score appears as N/A. Higher score values generally indicate higher risk of stroke.          Points Metrics   0 Has Congestive Heart Failure:  No     Patients with congestive heart failure get 1 point.    Current as of just now   1 Has Hypertension:  Yes     Patients with hypertension get 1 point.    Current as of just now   1 Age:  69     Patients 65 to 74 years old get 1 point, or patients 75 years and older get 2 points.    Current as of just now   0 Has Diabetes:  No     Patients with diabetes get 1 point.    Current as of just now   0 Had Stroke:  No  Had TIA:  No  Had Thromboembolism:  No     Patients who have had a stroke, TIA, or thromboembolism get 2 points.    Current as of just now   0 Has Vascular Disease:  No     Patients with vascular disease get 1 point.    Current as of just now   0 Clinically Relevant Sex:  Male     Patients with a clinically relevant sex of Female get 1 point.    Current as of just now             Revised Cardiac Risk Index      Flowsheet Row Pre-Admission Testing from 5/16/2025 in [facility]   High-Risk Surgery (Intraperitoneal, Intrathoracic,Suprainguinal vascular) 0 filed at 05/16/2025 0933   History of ischemic heart disease (History of MI, History of positive exercuse test, Current chest paint considered due to myocardial ischemia, Use of nitrate therapy, ECG with pathological Q Waves) 0 filed at 05/16/2025 0933   History of congestive heart failure (pulmonary edemia, bilateral rales or S3 gallop, Paroxysmal nocturnal dyspnea, CXR showing pulmonary vascular redistribution) 1 filed at 05/16/2025 0933   History of cerebrovascular disease (Prior TIA or stroke) 0 filed at 05/16/2025 0933   Pre-operative insulin treatment 0 filed at 05/16/2025 0933    Pre-operative creatinine>2 mg/dl 0 filed at 05/16/2025 0933   Revised Cardiac Risk Calculator 1 filed at 05/16/2025 0933          Apfel Simplified Score    No data to display       Risk Analysis Index Results This Encounter    No data found in the last 10 encounters.       Stop Bang Score      Flowsheet Row Pre-Admission Testing from 5/16/2025 in [facility]   Do you snore loudly? 1  [on cpap] filed at 05/16/2025 0917   Do you often feel tired or fatigued after your sleep? 0 filed at 05/16/2025 0917   Has anyone ever observed you stop breathing in your sleep? 1 filed at 05/16/2025 0917   Do you have or are you being treated for high blood pressure? 1 filed at 05/16/2025 0917   Recent BMI (Calculated) 33 filed at 05/16/2025 0917   Is BMI greater than 35 kg/m2? 0=No filed at 05/16/2025 0917   Age older than 50 years old? 1=Yes filed at 05/16/2025 0917   Is your neck circumference greater than 17 inches (Male) or 16 inches (Female)? 1 filed at 05/16/2025 0917   Gender - Male 1=Yes filed at 05/16/2025 0917   STOP-BANG Total Score 6 filed at 05/16/2025 0917          Prodigy: High Risk  Total Score: 16              Prodigy Age Score      Prodigy Gender Score          ARISCAT Score for Postoperative Pulmonary Complications    No data to display       Esposito Perioperative Risk for Myocardial Infarction or Cardiac Arrest (ROBB)    No data to display         Assessment and Plan:     Paroxysmal atrial fibrillation: Ablation A-Fib Paroxysmal   Hypertrophic cardiomyopathy: pt is followed by cardiologist Dr. Sood. Pt is taking Entresto and Jardiance.   HTN: Pt is taking metoprolol succinate XL.   Hyperlipidemia: Pt is taking rosuvastatin and fenofibrate.   MIREYA: compliant with BIPAP use. Pt will soon be switching to CPAP.   GERD: Pt is taking omeprazole.   Osteoarthritis bilateral knees  BMI: 33    CBC, BMP, coag screen, and T&S collected in [facility].    EKG completed on 12/18/2024.    ECHO 11/1/2024:  CONCLUSIONS:    1. The left ventricular systolic function is mildly decreased, with a Masters's biplane calculated ejection fraction of 48%.   2. There is global hypokinesis of the left ventricle with minor regional variations.   3. Left ventricular diastolic filling cannot be determined, due to E/A wave fusion.   4. Left ventricular cavity size is mildly dilated.   5. There is normal right ventricular global systolic function.   6. Abnormal thickeness of the mid-apical LV wall, can not exclude Hypertrophic cardiomyopathy. No LV aneurysm. Cardiac MRI is recommended for further assessement.   7. The patient is in atrial fibrillation which may influence the estimate of left ventricular function and transvalvular flows.    Cardiac MRI 11/26/2024:  IMPRESSION:  1. 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 A. Focal hypertrophy of the distal anterolateral wall  1.7 cm. B. Apical displacement of the papillary muscles. No evidence  of left ventricular apical aneurysm. C. Delayed enhancement imaging  reveals hyperenhancement the distal anterolateral wall (50% thickness of myocardium), and hyperenhancement of the left ventricular apex. Findings are consistent with fibrosis. LV scar burden 6%.  2. Normal left ventricular cavity size with preserved systolic  function. Ejection fraction 64%.  3. Normal right ventricular cavity size with preserved systolic  function. RV EF 58%.    Left Heart Cath 12/2/2024:  CONCLUSIONS:   1. Left main: no signficant angiographic CAD.   2. LAD: 10-20% mid-distal stenosis.   3. LCx: 30% mid-vessel disease.   4. RCA: no significant angiographic disease.   5. LVEDP 11mmHg, no aortic stenosis on LV-Ao gradient.    YAYA Sanon-CAMERON Marcelino was notified about the patient's creatinine level and GFR from the blood work collected in [facility].    YAYA Sanon-CAMERON         [1]   Family History  Problem Relation Name Age of Onset    Other (passed of heart  attack) Father Prashanth     Hypertension Father Prashanth     Heart attack Father Prashanth     Cancer Sister Sade     Miscarriages / Stillbirths Mother Saritha     Hypertension Brother Jeffrey     Intellectual Disability Brother Jonathan     Stroke Maternal Grandfather Jeffrey Butler    [2] No Known Allergies     [Fatigue] : fatigue [Sinus Problems] : sinus problems [Arthralgias] : arthralgias [Negative] : Neurologic [Depression] : no depression [Anxiety] : no anxiety [TextBox_30] : Per HPI [TextBox_44] : Hypertension [TextBox_94] : History of disc osteomyelitis [TextBox_144] : Hypothyroid

## 2025-05-18 DIAGNOSIS — I25.10 CORONARY ARTERIOSCLEROSIS: ICD-10-CM

## 2025-05-19 RX ORDER — ROSUVASTATIN CALCIUM 20 MG/1
20 TABLET, COATED ORAL DAILY
Qty: 90 TABLET | Refills: 0 | Status: SHIPPED | OUTPATIENT
Start: 2025-05-19

## 2025-05-21 RX ORDER — SODIUM CHLORIDE 9 MG/ML
100 INJECTION, SOLUTION INTRAVENOUS CONTINUOUS
Status: CANCELLED | OUTPATIENT
Start: 2025-05-21 | End: 2025-05-21

## 2025-05-22 ENCOUNTER — TELEPHONE (OUTPATIENT)
Dept: CARDIOLOGY | Facility: HOSPITAL | Age: 70
End: 2025-05-22

## 2025-06-02 ENCOUNTER — TELEPHONE (OUTPATIENT)
Dept: VASCULAR SURGERY | Facility: CLINIC | Age: 70
End: 2025-06-02
Payer: MEDICARE

## 2025-06-02 NOTE — TELEPHONE ENCOUNTER
Per the pt:  I was scheduled for cardiac ablation on May 23rd and it was cancelled. I have not been contacted with regard to a reschedule date as yet.       Please contact and advise pt

## 2025-06-04 DIAGNOSIS — I48.0 PAROXYSMAL ATRIAL FIBRILLATION (MULTI): ICD-10-CM

## 2025-06-23 ENCOUNTER — OFFICE VISIT (OUTPATIENT)
Dept: CARDIOLOGY | Facility: CLINIC | Age: 70
End: 2025-06-23
Payer: MEDICARE

## 2025-06-23 ENCOUNTER — HOSPITAL ENCOUNTER (OUTPATIENT)
Dept: CARDIOLOGY | Facility: CLINIC | Age: 70
Discharge: HOME | End: 2025-06-23
Payer: MEDICARE

## 2025-06-23 VITALS
BODY MASS INDEX: 32.86 KG/M2 | DIASTOLIC BLOOD PRESSURE: 75 MMHG | HEART RATE: 49 BPM | WEIGHT: 229 LBS | OXYGEN SATURATION: 95 % | SYSTOLIC BLOOD PRESSURE: 119 MMHG

## 2025-06-23 DIAGNOSIS — I48.0 PAROXYSMAL A-FIB (MULTI): ICD-10-CM

## 2025-06-23 DIAGNOSIS — N17.9 AKI (ACUTE KIDNEY INJURY): Primary | ICD-10-CM

## 2025-06-23 DIAGNOSIS — I48.91 UNSPECIFIED ATRIAL FIBRILLATION (MULTI): ICD-10-CM

## 2025-06-23 DIAGNOSIS — I42.2 CARDIOMYOPATHY, HYPERTROPHIC (MULTI): ICD-10-CM

## 2025-06-23 DIAGNOSIS — I42.8 NICM (NONISCHEMIC CARDIOMYOPATHY) (MULTI): ICD-10-CM

## 2025-06-23 DIAGNOSIS — I10 PRIMARY HYPERTENSION: ICD-10-CM

## 2025-06-23 DIAGNOSIS — E78.00 PURE HYPERCHOLESTEROLEMIA: ICD-10-CM

## 2025-06-23 DIAGNOSIS — I25.10 CORONARY ARTERY CALCIFICATION: ICD-10-CM

## 2025-06-23 DIAGNOSIS — R06.02 SHORTNESS OF BREATH: ICD-10-CM

## 2025-06-23 LAB
AORTIC VALVE MEAN GRADIENT: 6 MMHG
AORTIC VALVE PEAK VELOCITY: 1.64 M/S
AV PEAK GRADIENT: 11 MMHG
AVA (PEAK VEL): 3 CM2
AVA (VTI): 2.67 CM2
EJECTION FRACTION APICAL 4 CHAMBER: 63.2
EJECTION FRACTION: 61 %
LEFT ATRIUM VOLUME AREA LENGTH INDEX BSA: 54.1 ML/M2
LEFT VENTRICLE INTERNAL DIMENSION DIASTOLE: 5.15 CM (ref 3.5–6)
LEFT VENTRICULAR OUTFLOW TRACT DIAMETER: 2.09 CM
MITRAL VALVE E/A RATIO: 2.28
MITRAL VALVE E/E' RATIO: 6.77
RIGHT VENTRICLE FREE WALL PEAK S': 19.99 CM/S
RIGHT VENTRICLE PEAK SYSTOLIC PRESSURE: 26 MMHG
TRICUSPID ANNULAR PLANE SYSTOLIC EXCURSION: 2.2 CM

## 2025-06-23 PROCEDURE — 93306 TTE W/DOPPLER COMPLETE: CPT

## 2025-06-23 PROCEDURE — 3078F DIAST BP <80 MM HG: CPT | Performed by: STUDENT IN AN ORGANIZED HEALTH CARE EDUCATION/TRAINING PROGRAM

## 2025-06-23 PROCEDURE — 3074F SYST BP LT 130 MM HG: CPT | Performed by: STUDENT IN AN ORGANIZED HEALTH CARE EDUCATION/TRAINING PROGRAM

## 2025-06-23 PROCEDURE — G2211 COMPLEX E/M VISIT ADD ON: HCPCS | Performed by: STUDENT IN AN ORGANIZED HEALTH CARE EDUCATION/TRAINING PROGRAM

## 2025-06-23 PROCEDURE — 99215 OFFICE O/P EST HI 40 MIN: CPT | Performed by: STUDENT IN AN ORGANIZED HEALTH CARE EDUCATION/TRAINING PROGRAM

## 2025-06-23 PROCEDURE — 93306 TTE W/DOPPLER COMPLETE: CPT | Performed by: STUDENT IN AN ORGANIZED HEALTH CARE EDUCATION/TRAINING PROGRAM

## 2025-06-23 PROCEDURE — 1159F MED LIST DOCD IN RCRD: CPT | Performed by: STUDENT IN AN ORGANIZED HEALTH CARE EDUCATION/TRAINING PROGRAM

## 2025-06-23 PROCEDURE — 99212 OFFICE O/P EST SF 10 MIN: CPT | Mod: 25

## 2025-06-23 ASSESSMENT — ENCOUNTER SYMPTOMS
OCCASIONAL FEELINGS OF UNSTEADINESS: 0
LOSS OF SENSATION IN FEET: 0
DEPRESSION: 0

## 2025-06-23 NOTE — PROGRESS NOTES
Primary Care Physician: Iraj Merritt MD   Date of Visit: 06/23/2025  9:00 AM EDT  Type of Visit: Follow up       Chief Complaint:  Chief Complaint   Patient presents with    Follow-up    6 months follow up    HPI  Van Latham 69 y.o. male with hx of MIREYA on CPAP, HTN, HLD, GERD referred for CT Ca score 180. ECHO 12/15/2023 LVSF 60-65%, aortic valve sclerosis.      Zio 11/2024 with afib 25% and 2 episodes of NSVT  ( 18 beats at , 5 beats at ). Repeated echo with LVEF 48%, pt was in afib. LHC 12/2024 with mild non obstructive disease. CMR with apical variant HCM. He was started on Eliquis and referred to EP      He is here today for follow up     Last time he had a fib was May 9th  He did not have the episodes of tachycardia. He had 2-3 episodes of sob over the last 6 months he thinks it might be related to afib.  No chest pain or angina  No dizziness or syncope  No LE edema   No orthopnea pr pnd  No bleeding issues     Takes meloxicam only as needed (rarely using it)  Does not take any other NSAIDs  He was scheduled for afib ablation 5/16/25 but it was postpone till next August     Review of Systems   Review of Systems   12 points review of systems are negative expect for the above    Social History:  Social History     Socioeconomic History    Marital status:      Spouse name: Not on file    Number of children: Not on file    Years of education: Not on file    Highest education level: Not on file   Occupational History    Not on file   Tobacco Use    Smoking status: Never    Smokeless tobacco: Never   Vaping Use    Vaping status: Never Used   Substance and Sexual Activity    Alcohol use: Not Currently     Alcohol/week: 2.0 standard drinks of alcohol     Types: 2 Glasses of wine per week     Comment: rare    Drug use: Never    Sexual activity: Yes     Partners: Female     Birth control/protection: None   Other Topics Concern    Not on file   Social History Narrative    Not on file     Social  "Drivers of Health     Financial Resource Strain: Not on file   Food Insecurity: No Food Insecurity (4/29/2025)    Hunger Vital Sign     Worried About Running Out of Food in the Last Year: Never true     Ran Out of Food in the Last Year: Never true   Transportation Needs: Not on file   Physical Activity: Not on file   Stress: Not on file   Social Connections: Not on file   Intimate Partner Violence: Not on file   Housing Stability: Not on file        Past Medical History:  Medical History[1]    Past Surgical History:  Surgical History[2]    Family History:  Family History[3]     Objective:       12/2/2024    10:45 AM 12/18/2024     8:21 AM 2/28/2025    11:45 AM 4/7/2025     7:50 PM 4/29/2025    10:50 AM 5/16/2025     9:14 AM 6/23/2025     8:54 AM   Vitals   Systolic 98 98 125 136 127 140 119   Diastolic 58 64 74 73 76 69 75   BP Location  Right arm Left arm       Heart Rate 89 122 55 55 54 55 49   Temp     36.8 °C (98.2 °F) 36.1 °C (97 °F)    Resp 20 16 18 14  16    Height   1.778 m (5' 10\")  1.778 m (5' 10\") 1.778 m (5' 10\")    Weight (lb)  222 226  233 230 229   BMI  31.85 kg/m2 32.43 kg/m2  33.43 kg/m2 33 kg/m2 32.86 kg/m2   BSA (m2)  2.23 m2 2.26 m2  2.29 m2 2.27 m2 2.27 m2   Visit Report Report Report Report  Report  Report      Constitutional:       Appearance: Healthy appearance. Not in distress.   Neck:      Vascular:  JVD normal.   Pulmonary:      Effort: Pulmonary effort is normal.      Breath sounds: Normal breath sounds. No wheezing. No rhonchi. No rales.   Cardiovascular:      Normal rate. Regular rhythm. Normal S1. Normal S2.       Murmurs: There is no murmur.      No gallop.  N No rub.   Pulses:     Intact distal pulses.   Edema:     Peripheral edema absent.   Abdominal:      General: Bowel sounds are normal.      Palpations: Abdomen is soft.      Tenderness: There is no abdominal tenderness.   Musculoskeletal: Normal range of motion.         General: No tenderness.   Skin:     General: Skin is warm " and dry.   Neurological:      General: No focal deficit present.      Mental Status: Alert and oriented to person, place and time.   Psychiatry:     Normal Mood     Allergies:  Allergies[4]    Medications:  Current Outpatient Medications   Medication Instructions    apixaban (ELIQUIS) 5 mg, oral, 2 times daily    ezetimibe (ZETIA) 10 mg, oral, Daily    fenofibrate (TRICOR) 145 mg, oral, Daily    fluticasone (Flonase) 50 mcg/actuation nasal spray 1 spray, Each Nostril, 2 times daily, Shake gently. Before first use, prime pump. After use, clean tip and replace cap.    levocetirizine (XYZAL) 5 mg, oral, Every evening    meloxicam (MOBIC) 15 mg, oral, Daily PRN    metoprolol succinate XL (TOPROL-XL) 50 mg, oral, Daily, Do not crush or chew.    omeprazole (PRILOSEC) 20 mg, oral, Daily    rosuvastatin (CRESTOR) 20 mg, oral, Daily    sacubitriL-valsartan (Entresto) 24-26 mg tablet 1 tablet, oral, 2 times daily    sildenafil (VIAGRA) 100 mg, oral, As needed    tadalafil (CIALIS) 10 mg, oral, Daily PRN    tadalafil (CIALIS) 5 mg, oral, Daily        Labs and Imaging:     I reviewed the following labs  Lab Results   Component Value Date    WBC 6.3 05/16/2025    HGB 16.4 05/16/2025    HCT 50.3 05/16/2025     05/16/2025    CHOL 139 11/11/2024    TRIG 142 11/11/2024    HDL 26.5 11/11/2024    ALT 26 11/11/2024    AST 28 11/11/2024     05/16/2025    K 4.4 05/16/2025     05/16/2025    CREATININE 1.83 (H) 05/16/2025    BUN 33 (H) 05/16/2025    CO2 24 05/16/2025    TSH 1.96 11/11/2024    PSA 5.6 (H) 12/20/2021    INR 1.2 05/16/2025    HGBA1C 5.8 (A) 11/23/2021       I reviewed the following:  EKG:   Recent Labs     10/09/24  1520 03/15/22  1045   ATRRATE 182 56   VENTRATE 146 56   PRINT  --  146   QRSDUR 90 96   QTCFRED 381 416   QTCCALCB 442 411   No results found for this or any previous visit (from the past 4464 hours).  Echocardiogram:   Recent Labs     06/23/25  0850 11/01/24  0942 12/15/23  0930   EF 61 48 64    LVIDD 5.15 4.46 5.31   RV 26  --   --    RVFRWALLPKSP 19.99 8.49 17.40   TAPSE 2.2 1.7 2.7   Transthoracic Echo (TTE) Complete With Contrast 11/01/2024    [facility], [address] [place] [address]  Tel [phone] and Fax [phone]    TRANSTHORACIC ECHOCARDIOGRAM REPORT      Patient Name:       DARIAN Nunez Physician:    Gita Pate MD  Study Date:         11/1/2024           Ordering Provider:    Gita PATE  MRN/PID:            [id number]            Fellow:  Accession#:         [id number]        Nurse:                Aixa Ramos RN  Date of Birth/Age:  1955 / 69 years Sonographer:          Mandy Akins RDCS  Gender assigned at                     Additional Staff:  Birth:  Height:             177.80 cm           Admit Date:           11/1/2024  Weight:             101.15 kg           Admission Status:     Outpatient  BSA / BMI:          2.19 m2 / 32.00     Encounter#:           [id number]  kg/m2  Blood Pressure:     134/92 mmHg         Department Location:  [facility] Non  Invasive    Study Type:    TRANSTHORACIC ECHO (TTE) COMPLETE  Diagnosis/ICD: Cardiac murmur, unspecified-R01.1; Other forms of dyspnea-R06.09;  Ventricular tachycardia, other-I47.29  Indication:    Heart Murmur, NSVT, Dyspnea on Exertion  CPT Code:      Echo Complete w Full Doppler-77110    Patient History:  Pertinent History: Murmur, HTN, A-Fib, Dyspnea and Hyperlipidemia. dizziness,  NSVT, abnormal EKG.    Study Detail: The following Echo studies were performed: 2D, M-Mode, Doppler and  color flow. Technically challenging study due to prominent lung  artifact. Definity used as a contrast agent for endocardial border  definition. Total contrast used for this procedure was 2.0 mL via  IV push. The patient was awake.      PHYSICIAN INTERPRETATION:  Left Ventricle: The left ventricular systolic function is mildly decreased, with a Masters's biplane calculated  ejection fraction of 48%. The patient is in atrial fibrillation which may influence the estimate of left ventricular function and transvalvular flows. There is mildly increased left ventricular hypertrophy. There is global hypokinesis of the left ventricle with minor regional variations. The left ventricular cavity size is mildly dilated. There is left ventricular concentric remodeling. Left ventricular diastolic filling cannot be determined, due to E/A wave fusion.  Left Atrium: The left atrium is normal in size.  Right Ventricle: The right ventricle is normal in size. There is normal right ventricular global systolic function.  Right Atrium: The right atrium is normal in size.  Aortic Valve: The aortic valve is trileaflet. The aortic valve dimensionless index is 1.30. There is no evidence of aortic valve regurgitation. The peak instantaneous gradient of the aortic valve is 4 mmHg. The mean gradient of the mitral valve is 2 mmHg.  Mitral Valve: The mitral valve is normal in structure. There is trace mitral valve regurgitation.  Tricuspid Valve: The tricuspid valve is structurally normal. There is trace to mild tricuspid regurgitation. The right ventricular systolic pressure is unable to be estimated.  Pulmonic Valve: The pulmonic valve is not well visualized. There is physiologic pulmonic valve regurgitation.  Pericardium: Trivial to small pericardial effusion.  Aorta: The aortic root is normal. The aortic root is at the upper limits of normal size.  Systemic Veins: The inferior vena cava appears normal in size, with IVC inspiratory collapse greater than 50%.  In comparison to the previous echocardiogram(s): Compared with study dated 12/15/2023, LVEF has dropped, patient is in afib.      CONCLUSIONS:  1. The left ventricular systolic function is mildly decreased, with a Masters's biplane calculated ejection fraction of 48%.  2. There is global hypokinesis of the left ventricle with minor regional variations.  3.  Left ventricular diastolic filling cannot be determined, due to E/A wave fusion.  4. Left ventricular cavity size is mildly dilated.  5. There is normal right ventricular global systolic function.  6. Abnormal thickeness of the mid-apical LV wall, can not exclude Hypertrophic cardiomyopathy. No LV aneurysm. Cardiac MRI is recommended for further assessement.  7. The patient is in atrial fibrillation which may influence the estimate of left ventricular function and transvalvular flows.    QUANTITATIVE DATA SUMMARY:    2D MEASUREMENTS:          Normal Ranges:  Ao Root d:       3.20 cm  (2.0-3.7cm)  IVSd:            1.28 cm  (0.6-1.1cm)  LVPWd:           1.17 cm  (0.6-1.1cm)  LVIDd:           4.46 cm  (3.9-5.9cm)  LVIDs:           3.10 cm  LV Mass Index:   92 g/m2  LVEDV Index:     50 ml/m2  LV % FS          30.5 %      LA VOLUME:                    Normal Ranges:  LA Vol A4C:        60.4 ml    (22+/-6mL/m2)  LA Vol A2C:        65.2 ml  LA Vol BP:         64.9 ml  LA Vol Index A4C:  27.6 ml/m2  LA Vol Index A2C:  29.8 ml/m2  LA Vol Index BP:   29.7 ml/m2  LA Area A4C:       20.5 cm2  LA Area A2C:       20.6 cm2  LA Major Axis A4C: 5.9 cm  LA Major Axis A2C: 5.5 cm  LA Volume Index:   27.6 ml/m2  LA Vol A4C:        55.8 ml  LA Vol A2C:        61.5 ml  LA Vol Index BSA:  26.8 ml/m2      RA VOLUME BY A/L METHOD:          Normal Ranges:  RA Area A4C:             15.0 cm2      M-MODE MEASUREMENTS:         Normal Ranges:  Ao Root:             3.80 cm (2.0-3.7cm)  AoV Exc:             2.30 cm (1.5-2.5cm)  LAs:                 2.90 cm (2.7-4.0cm)      AORTA MEASUREMENTS:         Normal Ranges:  AoV Exc:            2.30 cm (1.5-2.5cm)  Ao Sinus, d:        3.20 cm (2.1-3.5cm)  Asc Ao, d:          3.80 cm (2.1-3.4cm)      LV SYSTOLIC FUNCTION BY 2D PLANIMETRY (MOD):  Normal Ranges:  EF-A4C View:    49 % (>=55%)  EF-A2C View:    48 %  EF-Biplane:     48 %  LV EF Reported: 48 %      LV DIASTOLIC FUNCTION:            Normal  Ranges:  MV Peak E:             0.74 m/s   (0.7-1.2 m/s)  MV e'                  0.114 m/s  (>8.0)  MV lateral e'          0.14 m/s  MV medial e'           0.09 m/s  E/e' Ratio:            6.49       (<8.0)  PulmV Sys Greg:         35.30 cm/s  PulmV Gomez Greg:        46.80 cm/s  PulmV S/D Greg:         0.80      MITRAL VALVE:          Normal Ranges:  MV DT:        232 msec (150-240msec)      AORTIC VALVE:                     Normal Ranges:  AoV Vmax:                1.00 m/s (<=1.7m/s)  AoV Peak P.0 mmHg (<20mmHg)  AoV Mean P.0 mmHg (1.7-11.5mmHg)  LVOT Max Greg:            1.19 m/s (<=1.1m/s)  AoV VTI:                 16.10 cm (18-25cm)  LVOT VTI:                21.00 cm  LVOT Diameter:           2.00 cm  (1.8-2.4cm)  AoV Area, VTI:           4.10 cm2 (2.5-5.5cm2)  AoV Area,Vmax:           3.75 cm2 (2.5-4.5cm2)  AoV Dimensionless Index: 1.30      RIGHT VENTRICLE:  RV Basal 3.14 cm  RV Mid   2.09 cm  RV Major 7.2 cm  TAPSE:   17.3 mm  RV s'    0.08 m/s      TRICUSPID VALVE/RVSP:         Normal Ranges:  IVC Diam:             1.41 cm      PULMONIC VALVE:          Normal Ranges:  PV Max Greg:     0.8 m/s  (0.6-0.9m/s)  PV Max P.7 mmHg      Pulmonary Veins:  PulmV Gomez Greg: 46.80 cm/s  PulmV S/D Greg:  0.80  PulmV Sys Greg:  35.30 cm/s      [id number] Samanta Pate MD  Electronically signed on 2024 at 1:54:01 PM        ** Final **    Coronary Angiography:   LVGRAM 2024    [facility], Cath Lab, [address] [place] [address]    Cardiovascular Catheterization Report    Patient Name:      DARAIN W WHIPPLE       Performing Physician:  [id number] Jean Avila MD  Study Date:        2024           Verifying Physician:   [id number] Jean Avila MD  MRN/PID:           [id number]            Cardiologist/Co-Scrub:  Accession#:        [id number]        Ordering Provider:     [id number] SAMANTA PATE  Date of Birth/Age: 1955 / 69 years Cardiologist:  Gender:             SUNNY                   Fellow:  Encounter#:        [id number]          Surgeon:      Study: Left Heart Cath      Indications:  DARIAN HAMM is a 69 year old male who presents with dyslipidemia, hypertension, atrial fibrillation and an anginal equivalent chest pain assessment (i.e. dyspnea on exertion believed to be from ischemia). Cardiomyopathy.    Procedure Description:  After infiltration with 2% Lidocaine, the right radial artery was cannulated with a modified Seldinger technique. Subsequently a 6 [place]bourgish sheath was placed in the right radial artery. Selective coronary catheterization was performed using a 5 Fr catheter(s) exchanged over a guide wire to cannulate the coronary arteries.  Multiple injections of contrast were made into the left and right coronary arteries with angiograms recorded in multiple projections.    Coronary Angiography:  The coronary circulation is right dominant.    Left Main Coronary Artery:  The left main coronary artery showed no significant disease or stenosis greater than 30%.    Left Anterior Descending Coronary Artery Distribution:  The mid to distal left anterior descending coronary artery showed 10 to 30% stenosis. This lesion was focal.    Circumflex Coronary Artery Distribution:  The mid circumflex coronary artery showed 30% stenosis.    Right Coronary Artery Distribution:    The RCA showed no significant disease or stenosis greater than 30%.    Coronary Lesion Summary:  Vessel          Stenosis      Vessel Segment  LAD        10 to 30% stenosis mid to distal  Circumflex    30% stenosis         mid      Hemo Personnel:  +-------------------+---------+  Name               Duty       +-------------------+---------+  Jean Avila MD 1  +-------------------+---------+      Hemodynamic Pressures:    +----+-------------------+---------+------------+-------------+------+---------+  Site     Date Time       Phase    Systolic    Diastolic    ED  Mean  mmHg                           Name       mmHg        mmHg      mmHg            +----+-------------------+---------+------------+-------------+------+---------+    LV  12/2/2024 8:43:06 AIR REST          92            8    15                                AM                                                   +----+-------------------+---------+------------+-------------+------+---------+    LV  12/2/2024 8:43:15 AIR REST          89            5    10                                AM                                                   +----+-------------------+---------+------------+-------------+------+---------+   LVp  12/2/2024 8:43:19 AIR REST          89            6    11                                AM                                                   +----+-------------------+---------+------------+-------------+------+---------+   AOp  12/2/2024 8:43:26 AIR REST          87           56             69                       AM                                                   +----+-------------------+---------+------------+-------------+------+---------+    AO  12/2/2024 8:44:08 AIR REST          88           57             67                       AM                                                   +----+-------------------+---------+------------+-------------+------+---------+      Cardiac Cath Post Procedure Notes:  Post Procedure Diagnosis: Nonischemic CMO.  Blood Loss:               Estimated blood loss during the procedure was 5cc mls.  Specimens Removed:        Number of specimen(s) removed: none.      Recommendations:  Maximize medical therapy.    ____________________________________________________________________________________  CONCLUSIONS:  1. Left main: no signficant angiographic CAD.  2. LAD: 10-20% mid-distal stenosis.  3. LCx: 30% mid-vessel disease.  4. RCA: no significant angiographic disease.  5.  "LVEDP 11mmHg, no aortic stenosis on LV-Ao gradient.    ICD 10 Codes:  Cardiomyopathy, unspecified-I42.9; Paroxysmal atrial fibrillation-I48.0    CPT Codes:  Left Heart Cath (visualization of coronaries) and LV-06577; Moderate Sedation Services initial 15 minutes patient >5 years-47344    [id number] Jean Avila MD  Performing Physician  Electronically signed by [id number] Jean Avila MD on 12/2/2024 at 9:14:17 AM          ** Final **    Right Heart Cath: No results found for this or any previous visit from the past 1800 days.    Cardiac Scoring:   CT cardiac scoring wo IV contrast 06/27/2023    Narrative  Interpreted By:  JEAN WHEELER MD  MRN: [id number]  Patient Name: DARIAN HAMM    STUDY:  CT CARDIAC SCORING;  6/27/2023 7:42 am    INDICATION:  HTN, heart risk scoring.    COMPARISON:  None.    ACCESSION NUMBER(S):  [id number]    ORDERING CLINICIAN:  JP RUBY    TECHNIQUE:  Using prospective ECG gating, CT scan of the coronary arteries was  performed without intravenous contrast. Coronary calcium scoring  was  performed according to the method of Agatston.    FINDINGS:  The score and distribution of calcium in the coronary arteries is as  follows:    LM 0,  LAD 68.93,  LCx 28.06,  RCA 83.91,    Total 180.9    The visualized mid/lower ascending thoracic aorta measures 3.3 cm in  diameter. The heart is normal in size. No pericardial effusion is  present.    No gross mediastinal or hilar lymphadenopathy or mass is identified.  The visualized segments of the lungs are clear aside from faint  \"ground-glass\" changes in the basal left lower lobe and medial right  lower lobe that may be subsegmental atelectasis or chronic but are  not entirely specific and should be correlated clinically.    The visualized subdiaphragmatic structures appear normal.    Impression  1. Coronary artery calcium score of 180.9*.    *Coronary artery calcium scoring may be helpful in predicting the  risk for future coronary heart disease " "events.  According to the  American College of Cardiology Foundation Clinical Expert Consensus  Task Force, such testing provides important prognostic information in  patients with more than one coronary heart disease risk factor. The  coronary artery calcium score correlates with the annual risk of a  non-fatal myocardial infarction or coronary heart disease death.    Coronary artery score            Annual Risk    0-99                             0.4%  100-399                        1.3%  >400                            2.4%    These three \"breakpoints\" correspond to lower, intermediate and high  risk states for future coronary events.  Such information should be  used, along with appropriate clinical judgment, to make decisions  regarding the intensity of risk factor management strategies to treat  blood lipids and to modify other non-lipid coronary risk factors.    Reference: [place] P et al. Circulation.  2007; 115:402-426    Cardiac MRI:   MR cardiac morphology and function w and wo IV contrast 11/26/2024  MR cardiac resonance imaging for velocity flow mapping 11/26/2024    Narrative  Interpreted By:  Didier Mckeon,  STUDY:  MR CARDIAC RESONANCE IMAGING FOR VELOCITY FLOW MAPPING; MR CARDIAC  MORPHOLOGY AND FUNCTION W AND WO IV CONTRAST;  11/26/2024 12:21 pm    INDICATION:  Signs/Symptoms:HTN, CM; Signs/Symptoms:Cardiomyopathy.   This study  is performed to assess myocardial viability and damage, and to  quantitate left ventricular and valvular function.    ,I10 Essential (primary) hypertension,G47.30 Sleep apnea,  unspecified,I42.9 Cardiomyopathy, unspecified,I48.0 Paroxysmal atrial  fibrillation (Multi),I25.10 Atherosclerotic heart disease of native  coronary artery without angina pectoris    COMPARISON:  None.    ACCESSION NUMBER(S):  [id number]; [id number]    ORDERING CLINICIAN:  SAMANTA PATE    TECHNIQUE:  [employer] 1.5 [place] MRI scanner.  Turbo spin echo and balanced steady state free precession " (bSSFP)  imaging for anatomic definition.    Flow quantification sequences for hemodynamics.  Delayed gadolinium enhancement analysis after injection of 40 ML  Dotarem.    HT-70 inches, 178 cm; WT-225 pounds, 102 kg; BSA-2.19 m2    FINDINGS:  CARDIAC CHAMBERS  Normal atrioventricular and ventriculoarterial concordance    LEFT ATRIUM  Dilated 4.7 cm. (Area-32 cm2)    RIGHT ATRIUM  Normal size (Area-21 cm2)    INTERATRIAL SEPTUM  Intact.    LEFT VENTRICLE  The left ventricle is normal in left ventricular cavity size, and has  normal global systolic function. There are no segmental wall motion  abnormalities. Apical hypertrophy. Distal anterolateral wall 1.7 cm.  Basal anterior septum 1.2 cm. Posterior wall 1.0 cm. No evidence of  left ventricular apical aneurysm. Apical displacement of papillary  muscles. Quantitative left ventricular functional values are as  follows: EDV = 204 cc; EDVi = 93 cc/m2  ESV = 74 cc; ESVi = 34 cc/m2  Stroke volume = 130 cc; SVi = 59 cc/m2  LVEF = 64 %  Absolute Cardiac Output = 7.9 l/min.; COi = 3.6 l/min/m2  LV mass = 228 gm; LVMi = 104 gm/m2 (normal 57-90)    *Cassie AM et al. Normalized left ventricular systolic and diastolic  function by steady state free precession cardiovascular magnetic  resonance. J Cardiovasc Magn Reson 2006; 8:417-26.    Delayed enhancement imaging reveals hyperenhancement the distal  anterolateral wall (50% thickness of myocardium). Hyperenhancement of  the left ventricular apex. Findings are consistent with fibrosis. LV  scar burden 6%.    RIGHT VENTRICLE  The right ventricle appears normal in size, shape, and has normal  qualitative systolic function. No segmental wall motion  abnormalities. No abnormal delayed enhancement in the myocardium. RV  EF 58%    INTERVENTRICULAR SEPTUM  Intact.    AORTIC VALVE  Trileaflet aortic valve without stenosis.  There is  no aortic regurgitation.  Flow quantification through the ascending aorta:  Forward volume =104  cc/beat  Reverse volume = 4 cc/beat  Net forward volume = 99 cc/beat  Aortic valve peak velocity 1.45 m/sec.    MITRAL VALVE  There is  trivial mitral regurgitation.      TRICUSPID VALVE  There is qualitative trivial tricuspid regurgitation.    Flow analysis  QP 7 liters/minute  QS 6 liters/minute  QP/QS 1.2    THORACIC AORTA  The thoracic aorta appears normal in course, caliber, and contour.  There is no evidence for acute aortic pathology. The arch vessel  branching pattern is  normal.   All the arch branch vessels appear  widely patent in their proximal portions. Aortic root 3.7 cm  Ascending aorta 3.3 cm    PULMONARY ARTERIES  The central pulmonary arteries appear  normal (MPA-2.9 cm, RPA-2.2  cm, LPA-2.5 cm).    SYSTEMIC AND PULMONARY VEINS  Normal systemic venous and pulmonary venous return.  The SVC and IVC are of normal caliber.  Normal pulmonary venous anatomy.    CHEST  The chest wall is normal.  No significant lymphadenopathy or mass is seen in limited images of  the mediastinum. Limited imaging through the lungs reveals no gross  abnormalities. No pleural effusion.    UPPER ABDOMEN  Limited imaging through the upper abdomen reveals no abnormalities of  the visualized organs.    A. Focal hypertrophy of the distal anterolateral wall 1.7 cm.  B. Apical displacement of the papillary muscles. No evidence of left  ventricular apical aneurysm. C. Delayed enhancement imaging reveals  hyperenhancement the distal anterolateral wall (50% thickness of  myocardium). Hyperenhancement of the left ventricular apex. Findings  are consistent with fibrosis. LV scar burden 6%.    Impression  1. 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 A. Focal hypertrophy of the distal anterolateral wall  1.7 cm. B. Apical displacement of the papillary muscles. No evidence  of left ventricular apical aneurysm. C. Delayed enhancement imaging  reveals hyperenhancement the distal anterolateral wall (50%  thickness  of myocardium), and hyperenhancement of the left ventricular apex.  Findings are consistent with fibrosis. LV scar burden 6%.  2. Normal left ventricular cavity size with preserved systolic  function. Ejection fraction 64%.  3. Normal right ventricular cavity size with preserved systolic  function. RV EF 58%.        MACRO:  None    Signed by: Didier Linda 11/27/2024 9:05 AM  Dictation workstation:   [id number]    Nuclear:No results found for this or any previous visit from the past 1800 days.    Metabolic Stress: No results found for this or any previous visit from the past 1800 days.        The 10-year ASCVD risk score (Maykel AVITIA, et al., 2019) is: 17.9%    Values used to calculate the score:      Age: 69 years      Sex: Male      Is Non- : No      Diabetic: No      Tobacco smoker: No      Systolic Blood Pressure: 119 mmHg      Is BP treated: Yes      HDL Cholesterol: 26.5 mg/dL      Total Cholesterol: 139 mg/dL     Assessment/Plan:   1. MIKE (acute kidney injury)        2. NICM (nonischemic cardiomyopathy) (Multi)        3. Cardiomyopathy, hypertrophic (Multi)        4. Paroxysmal A-fib (Multi)        5. Shortness of breath        6. Pure hypercholesterolemia  Lipid panel    Basic metabolic panel    Lipid panel    Basic metabolic panel         Van Latham 69 y.o. male with hx of MIREYA on CPAP, HTN, HLD, GERD referred for CT Ca score 180. ECHO 12/15/2023 LVSF 60-65%, aortic valve sclerosis.      Zio 11/2024 with afib 25% and 2 episodes of NSVT  ( 18 beats at , 5 beats at )      Echo in afib with LVEF 48% with concerning mid-apical hypertrophy, can not exclude HCM. No intracavitary gradient noted at rest    Nuc negative, LVEF 36%     CMR 11/2024 with normal LVEF, apical HCM with max thickness 1.7cm with apical displacement of the papillary muscles. No LV aneurysm. Scar burden 6%  Underwent LHC 12/2024 with mild non obstructive disease in LCX    Echo today with normal  LVEF, no apical gradients noted at rest and with provocation      His cardiomyopathy is NICM, tachy-mediated in the setting of Apical hypertrophic cardiomyopathy    NYHA class I symptoms  Euvolemic  Still has symptomatic episodes of afib    Plan  Continue Fenofibrate   Continue Crestor   Continue zetia   Continue Eliquis   Continue Torpol XL   Continue entresto   Stop Jardiance given new worsening creat. Limit NSAID use   Will repeat BMP and Lipids after 2 weeks, will contact with results. Will refer to nephrology if creat remains unchanged    Referred to cardiac genetics and recommend 1st degree relatives screening   Follow up with EP regarding his afib, he is scheduled for ablation 8/2025, and for HCM risk stratification        # Cardiovascular Health Maintenance  - Physical Activity: Encourage 10-15K steps per day  - Healthy Diet: Avoid high fat and high sodium foods (processed meats / fast foods). Consider a mediterranean or DASH diet, emphasizing vegetables, fruits, whole grains, lean proteins  - Avoidance of smoking and smoke exposure        Time Spent: I spent 40 minutes reviewing medical testing, obtaining medical history and counselling and educating on diagnosis and documenting clinical encounter.        ____________________________________________________________  Piotr Sood MD   of Medicine  Senior Attending Physician   Division of Cardiovascular Medicine   [facility]  [facility]           [1]   Past Medical History:  Diagnosis Date    Acute lower UTI 12/12/2023    Acute pharyngitis 12/12/2023    Arthritis 1/15/2020    Benign prostatic hyperplasia 12/15/2021    Body mass index (BMI) 33.0-33.9, adult 05/17/2021    Body mass index (BMI) of 33.0 to 33.9 in adult    Chest pressure 12/12/2023    Daytime somnolence 12/12/2023    Elevated PSA 12/15/2021    Erectile dysfunction 6/1/1995    Fatigue 12/12/2023    GERD (gastroesophageal reflux disease)  6/1/2013    Hypercholesteremia 12/12/2023    Hypertension 6/1/2016    Hypertrophic cardiomyopathy (Multi)     Occult blood in stools 12/12/2023    Osteoarthritis     bilateral knees    Pain of left heel 12/12/2023    Pure hypercholesterolemia, unspecified 12/13/2022    Hypercholesteremia    Pure hyperglyceridemia     Hypertriglyceridemia    Sleep apnea 6/1/2009    Snoring 12/12/2023    Varicella 12/15/1961   [2]   Past Surgical History:  Procedure Laterality Date    CARDIAC CATHETERIZATION N/A 12/02/2024    Procedure: Left Heart Cath;  Surgeon: Jean Avila MD;  Location: [facility] Cardiac Cath Lab;  Service: Cardiovascular;  Laterality: N/A;    CIRCUMCISION, PRIMARY  1956    FRACTURE SURGERY  8/15/1963    Multiple fracture repairs    PROSTATE SURGERY  08/2022    for BPH    VASECTOMY  7/1/1986   [3]   Family History  Problem Relation Name Age of Onset    Other (passed of heart attack) Father Prashanth     Hypertension Father Prashanth     Heart attack Father Prashanth     Cancer Sister Sade     Miscarriages / Stillbirths Mother Saritha     Hypertension Brother Jeffrey     Intellectual Disability Brother Jonathan     Stroke Maternal Grandfather Jeffrey Butler    [4] No Known Allergies

## 2025-06-24 DIAGNOSIS — K21.9 GASTROESOPHAGEAL REFLUX DISEASE WITHOUT ESOPHAGITIS: ICD-10-CM

## 2025-06-25 RX ORDER — OMEPRAZOLE 20 MG/1
20 CAPSULE, DELAYED RELEASE ORAL
Qty: 90 CAPSULE | Refills: 0 | Status: SHIPPED | OUTPATIENT
Start: 2025-06-25

## 2025-07-14 ENCOUNTER — APPOINTMENT (OUTPATIENT)
Dept: GENETICS | Facility: CLINIC | Age: 70
End: 2025-07-14
Payer: MEDICARE

## 2025-07-14 VITALS
WEIGHT: 229.5 LBS | BODY MASS INDEX: 32.86 KG/M2 | SYSTOLIC BLOOD PRESSURE: 126 MMHG | TEMPERATURE: 98.1 F | DIASTOLIC BLOOD PRESSURE: 75 MMHG | HEART RATE: 52 BPM | HEIGHT: 70 IN

## 2025-07-14 DIAGNOSIS — I42.2 CARDIOMYOPATHY, HYPERTROPHIC (MULTI): ICD-10-CM

## 2025-07-14 DIAGNOSIS — I42.8 NICM (NONISCHEMIC CARDIOMYOPATHY) (MULTI): ICD-10-CM

## 2025-07-14 PROCEDURE — GENMD PR GENETICS VISIT (MEDICAID/MEDICARE): Performed by: GENETIC COUNSELOR, MS

## 2025-07-14 NOTE — PROGRESS NOTES
"Subjective   Patient ID: Van Latham is a 69 y.o. male who was referred to cardio genetics clinic by Piotr Sood MD due to a personal history of apical hypertrophic cardiomyopathy (HCM). We reivewed the hereditary aspects of this condition, and the genetic testing options available to him.    Background Information: Van has been previously seen in Cardiology for MIREYA, HTN, HLD, afib, and elevated CAC score. In 2024, Van's Zio patch showed afib 25% and 2 episodes of NSVT. An echocardiogram was ordered and showed an LVEF of 48% with abnormal thickeness of the mid-apical LV wall, concerning for apical HCM. This was confirmed with cMRI. He has since been referred to cardio genetics clinic to screen for known hereditary causes of HCM.     Family History:  A three generation pedigree was collected, and was concerning for the following:    -74-year-old brother with afib, echo recently showed no HCM. Recently diagnosed with pulmonary fibrosis.  -62-year-old brother with \"leaky valve\", echo recently showed no HCM.  -70-year-old sister, echo recently showed no HCM.  -Mother  of pulmonary fibrosis in her 80s.  -Maternal aunt  of pulmonary fibrosis in her 80s.  -Father  of an MI at age 56.  -Paternal uncle  in his 90s of cancer, had a pacemaker.    We did note that Van's family history could be concerning for a hereditary form of pulmonary fibrosis, due to three close family members being diagnosis with the condition. Van's brother could pursue an evaluation with genetics to explore this possibility further.    Van is of English descent. Consanguinity was denied.    The rest of the family history was negative for intellectual disability, autism, birth defects, multiple miscarriages, early onset cancer or kidney concerns, and other hereditary conditions.    Assessment/Plan   Genetic Test Ordered: yes    Hypertrophic cardiomyopathy (HCM) is a condition in which the muscles of the left ventricle " Airway  Date/Time: 4/28/2020 10:53 AM  Urgency: elective    Airway not difficult    General Information and Staff    Patient location during procedure: OR  Anesthesiologist: Joselin Estes MD  Performed: anesthesiologist     Indications and Patient C thicken, and is most commonly due to a mutation of one of the genes currently known to encode different components of the sarcomere and is inherited in an autosomal dominant manner. For individuals with a family history of HCM, a pathogenic mutation (known harmful gene change) in one of the sarcomere genes can be found in 50-60% of individuals. For individuals without a family history (simplex case), a pathogenic mutation can be found in around 20-30% of individuals.    The most common genes in which mutations are found in individuals with HCM are MYH7, MYBPC3, TNNT2, and TNNI3, however, mutations in many other genes have been found in individuals with HCM. Some individuals with HCM will actually have more than one mutation (either 2 mutations in the same gene, or 2 mutations in different genes). Because of the genetic heterogeneity seen in HCM, a broad panel approach to genetic testing is recommended.    We reviewed options for testing, specifically discussing a hypertrophic cardiomyopathy panel through [facility] that would examine 30 genes associated with the condition. We also reviewed the various possible test results, including positive, negative, and variant of unknown significance (VUS).    After discussion, the patient elected to proceed with genetic testing. Results should be available in 3-4 weeks and will be called out to the patient. Should a likely pathogenic or pathogenic mutation be identified, we will be able to offer family members targeted testing.  If no mutation is identified, cardiac screening for all 1st-degree relatives will be recommended.    Kaylan Baxter MS [facility]  Licensed Genetic Counselor  [place] [facility]  [phone]    Total time spent on day of encounter: 29 minutes

## 2025-07-27 DIAGNOSIS — I25.10 CORONARY ARTERIOSCLEROSIS: ICD-10-CM

## 2025-07-27 DIAGNOSIS — E78.00 PURE HYPERCHOLESTEROLEMIA: ICD-10-CM

## 2025-07-28 RX ORDER — FENOFIBRATE 145 MG/1
145 TABLET, FILM COATED ORAL DAILY
Qty: 90 TABLET | Refills: 0 | Status: SHIPPED | OUTPATIENT
Start: 2025-07-28

## 2025-07-28 RX ORDER — ROSUVASTATIN CALCIUM 20 MG/1
20 TABLET, COATED ORAL DAILY
Qty: 90 TABLET | Refills: 0 | Status: SHIPPED | OUTPATIENT
Start: 2025-07-28

## 2025-08-01 LAB
ANION GAP SERPL CALCULATED.4IONS-SCNC: 15 MMOL/L (CALC) (ref 7–17)
BUN SERPL-MCNC: 21 MG/DL (ref 7–25)
BUN/CREAT SERPL: ABNORMAL (CALC) (ref 6–22)
CALCIUM SERPL-MCNC: 10.3 MG/DL (ref 8.6–10.3)
CHLORIDE SERPL-SCNC: 109 MMOL/L (ref 98–110)
CHOLEST SERPL-MCNC: 125 MG/DL
CHOLEST/HDLC SERPL: 4.3 (CALC)
CO2 SERPL-SCNC: 17 MMOL/L (ref 20–32)
CREAT SERPL-MCNC: 1.22 MG/DL (ref 0.7–1.35)
EGFRCR SERPLBLD CKD-EPI 2021: 65 ML/MIN/1.73M2
GLUCOSE SERPL-MCNC: 94 MG/DL (ref 65–99)
HDLC SERPL-MCNC: 29 MG/DL
LDLC SERPL CALC-MCNC: 73 MG/DL (CALC)
NONHDLC SERPL-MCNC: 96 MG/DL (CALC)
POTASSIUM SERPL-SCNC: 4.5 MMOL/L (ref 3.5–5.3)
SODIUM SERPL-SCNC: 141 MMOL/L (ref 135–146)
TRIGL SERPL-MCNC: 149 MG/DL

## 2025-08-08 ENCOUNTER — PRE-ADMISSION TESTING (OUTPATIENT)
Dept: PREADMISSION TESTING | Facility: HOSPITAL | Age: 70
End: 2025-08-08
Payer: MEDICARE

## 2025-08-08 ENCOUNTER — APPOINTMENT (OUTPATIENT)
Facility: HOSPITAL | Age: 70
End: 2025-08-08
Payer: MEDICARE

## 2025-08-08 VITALS
DIASTOLIC BLOOD PRESSURE: 64 MMHG | OXYGEN SATURATION: 97 % | TEMPERATURE: 96.6 F | RESPIRATION RATE: 16 BRPM | HEIGHT: 70 IN | SYSTOLIC BLOOD PRESSURE: 119 MMHG | HEART RATE: 52 BPM | WEIGHT: 228.62 LBS | BODY MASS INDEX: 32.73 KG/M2

## 2025-08-08 DIAGNOSIS — Z01.818 PRE-OP EXAMINATION: Primary | ICD-10-CM

## 2025-08-08 LAB
ABO GROUP (TYPE) IN BLOOD: NORMAL
ANTIBODY SCREEN: NORMAL
BASOPHILS # BLD AUTO: 0.07 X10*3/UL (ref 0–0.1)
BASOPHILS NFR BLD AUTO: 1.3 %
EOSINOPHIL # BLD AUTO: 0.34 X10*3/UL (ref 0–0.7)
EOSINOPHIL NFR BLD AUTO: 6.2 %
ERYTHROCYTE [DISTWIDTH] IN BLOOD BY AUTOMATED COUNT: 15.6 % (ref 11.5–14.5)
HCT VFR BLD AUTO: 48.9 % (ref 41–52)
HGB BLD-MCNC: 16 G/DL (ref 13.5–17.5)
IMM GRANULOCYTES # BLD AUTO: 0.01 X10*3/UL (ref 0–0.7)
IMM GRANULOCYTES NFR BLD AUTO: 0.2 % (ref 0–0.9)
LYMPHOCYTES # BLD AUTO: 1.52 X10*3/UL (ref 1.2–4.8)
LYMPHOCYTES NFR BLD AUTO: 27.7 %
MCH RBC QN AUTO: 29.5 PG (ref 26–34)
MCHC RBC AUTO-ENTMCNC: 32.7 G/DL (ref 32–36)
MCV RBC AUTO: 90 FL (ref 80–100)
MONOCYTES # BLD AUTO: 0.6 X10*3/UL (ref 0.1–1)
MONOCYTES NFR BLD AUTO: 10.9 %
NEUTROPHILS # BLD AUTO: 2.94 X10*3/UL (ref 1.2–7.7)
NEUTROPHILS NFR BLD AUTO: 53.7 %
NRBC BLD-RTO: 0 /100 WBCS (ref 0–0)
PLATELET # BLD AUTO: 191 X10*3/UL (ref 150–450)
RBC # BLD AUTO: 5.42 X10*6/UL (ref 4.5–5.9)
RH FACTOR (ANTIGEN D): NORMAL
WBC # BLD AUTO: 5.5 X10*3/UL (ref 4.4–11.3)

## 2025-08-08 PROCEDURE — 86900 BLOOD TYPING SEROLOGIC ABO: CPT

## 2025-08-08 PROCEDURE — 99214 OFFICE O/P EST MOD 30 MIN: CPT

## 2025-08-08 PROCEDURE — 93010 ELECTROCARDIOGRAM REPORT: CPT | Performed by: INTERNAL MEDICINE

## 2025-08-08 PROCEDURE — 86850 RBC ANTIBODY SCREEN: CPT

## 2025-08-08 PROCEDURE — 93005 ELECTROCARDIOGRAM TRACING: CPT

## 2025-08-08 PROCEDURE — 36415 COLL VENOUS BLD VENIPUNCTURE: CPT

## 2025-08-08 PROCEDURE — 85025 COMPLETE CBC W/AUTO DIFF WBC: CPT

## 2025-08-08 RX ORDER — DICLOFENAC SODIUM 10 MG/G
4 GEL TOPICAL 4 TIMES DAILY PRN
COMMUNITY

## 2025-08-08 ASSESSMENT — DUKE ACTIVITY SCORE INDEX (DASI)
CAN YOU WALK A BLOCK OR TWO ON LEVEL GROUND: YES
CAN YOU PARTICIPATE IN MODERATE RECREATIONAL ACTIVITIES LIKE GOLF, BOWLING, DANCING, DOUBLES TENNIS OR THROWING A BASEBALL OR FOOTBALL: YES
CAN YOU HAVE SEXUAL RELATIONS: YES
CAN YOU DO YARD WORK LIKE RAKING LEAVES, WEEDING OR PUSHING A MOWER: YES
DASI METS SCORE: 9
CAN YOU WALK INDOORS, SUCH AS AROUND YOUR HOUSE: YES
TOTAL_SCORE: 50.7
CAN YOU RUN A SHORT DISTANCE: YES
CAN YOU DO MODERATE WORK AROUND THE HOUSE LIKE VACUUMING, SWEEPING FLOORS OR CARRYING GROCERIES: YES
CAN YOU TAKE CARE OF YOURSELF (EAT, DRESS, BATHE, OR USE TOILET): YES
CAN YOU DO LIGHT WORK AROUND THE HOUSE LIKE DUSTING OR WASHING DISHES: YES
CAN YOU DO HEAVY WORK AROUND THE HOUSE LIKE SCRUBBING FLOORS OR LIFTING AND MOVING HEAVY FURNITURE: YES
CAN YOU CLIMB A FLIGHT OF STAIRS OR WALK UP A HILL: YES
CAN YOU PARTICIPATE IN STRENOUS SPORTS LIKE SWIMMING, SINGLES TENNIS, FOOTBALL, BASKETBALL, OR SKIING: NO

## 2025-08-08 ASSESSMENT — ENCOUNTER SYMPTOMS
HEMATOLOGIC/LYMPHATIC NEGATIVE: 1
NEUROLOGICAL NEGATIVE: 1
RESPIRATORY NEGATIVE: 1
MUSCULOSKELETAL NEGATIVE: 1
ENDOCRINE NEGATIVE: 1
GASTROINTESTINAL NEGATIVE: 1
EYES NEGATIVE: 1
PSYCHIATRIC NEGATIVE: 1
ALLERGIC/IMMUNOLOGIC NEGATIVE: 1
CARDIOVASCULAR NEGATIVE: 1
CONSTITUTIONAL NEGATIVE: 1

## 2025-08-08 ASSESSMENT — PAIN - FUNCTIONAL ASSESSMENT: PAIN_FUNCTIONAL_ASSESSMENT: 0-10

## 2025-08-08 ASSESSMENT — PAIN SCALES - GENERAL: PAINLEVEL_OUTOF10: 0 - NO PAIN

## 2025-08-08 NOTE — PREPROCEDURE INSTRUCTIONS
Medication List            Accurate as of August 8, 2025  8:06 AM. Always use your most recent med list.                apixaban 5 mg tablet  Commonly known as: Eliquis  Take 1 tablet (5 mg) by mouth 2 times a day.  Medication Adjustments for Surgery: Take last dose 1 day (24 hours) before surgery     ezetimibe 10 mg tablet  Commonly known as: Zetia  Take 1 tablet (10 mg) by mouth once daily.  Medication Adjustments for Surgery: Take last dose 1 day (24 hours) before surgery     fenofibrate 145 mg tablet  Commonly known as: Tricor  TAKE 1 TABLET ONCE DAILY  Medication Adjustments for Surgery: Take last dose 1 day (24 hours) before surgery     fluticasone 50 mcg/actuation nasal spray  Commonly known as: Flonase  Administer 1 spray into each nostril 2 times a day for 7 days. Shake gently. Before first use, prime pump. After use, clean tip and replace cap.  Notes to patient: NOT TAKING     levocetirizine 5 mg tablet  Commonly known as: Xyzal  Take 1 tablet (5 mg) by mouth once daily in the evening for 7 days.  Medication Adjustments for Surgery: Do Not take on the morning of surgery  Notes to patient: NOT TAKING     meloxicam 15 mg tablet  Commonly known as: Mobic  Take 1 tablet (15 mg) by mouth once daily as needed for mild pain (1 - 3).  Additional Medication Adjustments for Surgery: Take last dose 7 days before surgery     metoprolol succinate XL 50 mg 24 hr tablet  Commonly known as: Toprol-XL  Take 1 tablet (50 mg) by mouth once daily. Do not crush or chew.  Additional Medication Adjustments for Surgery: Other (Comment)  Notes to patient: CALLED DR. JOHN'S OFFICE FOR INSTRUCTIONS. PER DR. DR ROACH'S INSTRUCTIONS, STOP 4 DAYS BEFORE PROCEDURE.     omeprazole 20 mg DR capsule  Commonly known as: PriLOSEC  Take 1 capsule (20 mg) by mouth once daily in the morning. Take before meals.  Medication Adjustments for Surgery: Take on the morning of surgery     rosuvastatin 20 mg tablet  Commonly known as:  Crestor  TAKE 1 TABLET ONCE DAILY  Medication Adjustments for Surgery: Take on the morning of surgery     sacubitriL-valsartan 24-26 mg tablet  Commonly known as: Entresto  Take 1 tablet by mouth 2 times a day.  Medication Adjustments for Surgery: Take last dose 1 day (24 hours) before surgery     sildenafil 100 mg tablet  Commonly known as: Viagra  Take 1 tablet (100 mg) by mouth if needed for erectile dysfunction.  Medication Adjustments for Surgery: Take last dose 3 days before surgery     * tadalafil 10 mg tablet  Commonly known as: Cialis  Take 1 tablet (10 mg) by mouth once daily as needed for erectile dysfunction.  Medication Adjustments for Surgery: Take last dose 3 days before surgery     * tadalafil 5 mg tablet  Commonly known as: Cialis  Take 1 tablet (5 mg) by mouth once daily.  Medication Adjustments for Surgery: Take last dose 3 days before surgery     Voltaren Arthritis Pain 1 % gel  Generic drug: diclofenac sodium  Additional Medication Adjustments for Surgery: Take last dose 7 days before surgery           * This list has 2 medication(s) that are the same as other medications prescribed for you. Read the directions carefully, and ask your doctor or other care provider to review them with you.                                  Additional Instructions:     Day of Surgery:  Review your medication instructions, take indicated medications  Wear  comfortable loose fitting clothing  Do not use moisturizers, creams, lotions or perfume  All jewelry and valuables should be left at home      OKAY TO CONTINUE TAKING ASPIRIN AND/OR PLAVIX.    PLEASE MAKE SURE TO BRING AN UPDATED LIST OF YOUR MEDICATIONS TO YOUR PROCEDURE.    PLEASE MAKE SURE TO HAVE A RIDE HOME AFTER YOUR PROCEDURE.    THE [facility] WILL CONTACT YOU THE DAY BEFORE YOUR PROCEDURE REGARDING WHEN TO ARRIVE FOR YOUR PROCEDURE.    PLEASE CONTACT YOUR CARDIOLOGIST'S OFFICE ABOUT HOLDING ANY ANTIARRHYTHMIC MEDICATION.                   DR. KAY   [phone]     [facility], 2ND FLOOR, [phone]                  THE DAY OF YOUR PROCEDURE PLEASE DO NOT EAT OR DRINK ANYTHING AFTER MIDNIGHT.      OKAY TO TAKE MEDICATIONS WITH A SMALL SIP OF WATER.    IF YOU'RE A DIABETIC PLEASE MAKE SURE TO HOLD ALL DIABETIC MEDICATION THE DAY OF YOUR PROCEDURE.

## 2025-08-08 NOTE — CPM/PAT H&P
CPM/PAT Evaluation       Name: Van Latham (Van Latham)  /Age: 1955/70 y.o.     In-Person       Chief Complaint: Atrial fibrillation    HPI: Van Latham is a 70 year old male with a history of atrial fibrillation that was diagnosed a year ago. He states he is not in Afib all the time. He states when he is in Afib he has SOB, dizziness, and fatigue. He is scheduled for an atrial fibrillation ablation. He denies fever, chills , nausea, vomiting, and chest pain.    Medical History[1]    Surgical History[2]    Social History     Tobacco Use    Smoking status: Never    Smokeless tobacco: Never   Substance Use Topics    Alcohol use: Not Currently     Alcohol/week: 2.0 standard drinks of alcohol     Types: 2 Glasses of wine per week     Comment: rare     Social History     Substance and Sexual Activity   Drug Use Never         Family History[3]    Allergies[4]  Current Outpatient Medications   Medication Sig Dispense Refill    apixaban (Eliquis) 5 mg tablet Take 1 tablet (5 mg) by mouth 2 times a day. 180 tablet 3    diclofenac sodium (Voltaren Arthritis Pain) 1 % gel Apply 4.5 inches (4 g) topically 4 times a day as needed.      ezetimibe (Zetia) 10 mg tablet Take 1 tablet (10 mg) by mouth once daily. 90 tablet 3    fenofibrate (Tricor) 145 mg tablet TAKE 1 TABLET ONCE DAILY 90 tablet 0    meloxicam (Mobic) 15 mg tablet Take 1 tablet (15 mg) by mouth once daily as needed for mild pain (1 - 3). 90 tablet 0    metoprolol succinate XL (Toprol-XL) 50 mg 24 hr tablet Take 1 tablet (50 mg) by mouth once daily. Do not crush or chew. 90 tablet 3    omeprazole (PriLOSEC) 20 mg DR capsule Take 1 capsule (20 mg) by mouth once daily in the morning. Take before meals. 90 capsule 0    rosuvastatin (Crestor) 20 mg tablet TAKE 1 TABLET ONCE DAILY 90 tablet 0    sacubitriL-valsartan (Entresto) 24-26 mg tablet Take 1 tablet by mouth 2 times a day. 180 tablet 3    sildenafil (Viagra) 100 mg tablet Take 1 tablet (100 mg) by mouth  if needed for erectile dysfunction. 90 tablet 0    tadalafil (Cialis) 10 mg tablet Take 1 tablet (10 mg) by mouth once daily as needed for erectile dysfunction. 30 tablet 3    tadalafil (Cialis) 5 mg tablet Take 1 tablet (5 mg) by mouth once daily. 90 tablet 3    fluticasone (Flonase) 50 mcg/actuation nasal spray Administer 1 spray into each nostril 2 times a day for 7 days. Shake gently. Before first use, prime pump. After use, clean tip and replace cap. 16 g 0    levocetirizine (Xyzal) 5 mg tablet Take 1 tablet (5 mg) by mouth once daily in the evening for 7 days. 7 tablet 0     No current facility-administered medications for this visit.          Review of Systems   Constitutional: Negative.    HENT: Negative.     Eyes: Negative.    Respiratory: Negative.     Cardiovascular: Negative.    Gastrointestinal: Negative.    Endocrine: Negative.    Genitourinary: Negative.    Musculoskeletal: Negative.    Skin: Negative.    Allergic/Immunologic: Negative.    Neurological: Negative.    Hematological: Negative.    Psychiatric/Behavioral: Negative.             Physical Exam  Vitals reviewed.   Constitutional:       Appearance: Normal appearance.   HENT:      Head: Normocephalic and atraumatic.      Nose: Nose normal.      Mouth/Throat:      Mouth: Mucous membranes are moist.      Pharynx: Oropharynx is clear.     Eyes:      Extraocular Movements: Extraocular movements intact.      Conjunctiva/sclera: Conjunctivae normal.       Cardiovascular:      Rate and Rhythm: Normal rate and regular rhythm.      Pulses: Normal pulses.      Heart sounds: Normal heart sounds.   Pulmonary:      Effort: Pulmonary effort is normal.      Breath sounds: Normal breath sounds.   Abdominal:      Palpations: Abdomen is soft.   Genitourinary:     Comments: Assessment deferred to physician    Musculoskeletal:         General: Normal range of motion.      Cervical back: Normal range of motion and neck supple.     Skin:     General: Skin is warm and  "dry.     Neurological:      General: No focal deficit present.      Mental Status: He is alert and oriented to person, place, and time.     Psychiatric:         Mood and Affect: Mood normal.         Behavior: Behavior normal.         Thought Content: Thought content normal.         Judgment: Judgment normal.          PAT AIRWAY:   Airway:     Mallampati::  I    TM distance::  >3 FB    Neck ROM::  Full  normal        Visit Vitals  /64   Pulse 52   Temp 35.9 °C (96.6 °F) (Temporal)   Resp 16   Ht 1.778 m (5' 10\")   Wt 104 kg (228 lb 9.9 oz)   SpO2 97%   BMI 32.80 kg/m²   Smoking Status Never   BSA 2.27 m²       ASA: III  CHADS2: 4.0%  RCRI: 0.9  DASI: 50.7  METS: 9  STOP BAN      Assessment and Plan:     Paroxysmal atrial fibrillation : Ablation A-Fib Paroxysmal , Eliquis, Metoprolol  Nonischemic cardiomyopathy: Entresto  Hypertension  Hyperlipidemia: Zetia, fenofibrate, rosuvastatin  MIREYA: C-PAP  GERD: omeprazole    LABS: BMP 25, CBC, type and screen ordered in [facility]  EKG done in [facility]  TTE 25  CONCLUSIONS:   1. Left ventricular ejection fraction is normal calculated by Masters's biplane at 61%.   2. Left ventricular diastolic filling is indeterminate with normal left atrial filling pressure.   3. There is normal right ventricular global systolic function.   4. Mildly enlarged right ventricle.   5. The left atrial size is severely dilated.   6. The Doppler estimated RVSP is within normal limits at 26 mmHg.   7. There is apical hypertrophic cardiomyopathy. No apical gradients noted at rest or with valsulva.    Cardiac Catheterization 24  CONCLUSIONS:   1. Left main: no signficant angiographic CAD.   2. LAD: 10-20% mid-distal stenosis.   3. LCx: 30% mid-vessel disease.   4. RCA: no significant angiographic disease.   5. LVEDP 11mmHg, no aortic stenosis on LV-Ao gradient.  Verna Woody, APRN-CNP         [1]   Past Medical History:  Diagnosis Date    Acute lower UTI 2023    Acute pharyngitis " 12/12/2023    Arthritis 1/15/2020    Benign prostatic hyperplasia 12/15/2021    Body mass index (BMI) 33.0-33.9, adult 05/17/2021    Body mass index (BMI) of 33.0 to 33.9 in adult    Chest pressure 12/12/2023    Daytime somnolence 12/12/2023    Elevated PSA 12/15/2021    Erectile dysfunction 6/1/1995    Fatigue 12/12/2023    GERD (gastroesophageal reflux disease) 6/1/2013    Hypercholesteremia 12/12/2023    Hypertension 6/1/2016    Hypertrophic cardiomyopathy (Multi)     Occult blood in stools 12/12/2023    Osteoarthritis     bilateral knees    Pain of left heel 12/12/2023    Pure hypercholesterolemia, unspecified 12/13/2022    Hypercholesteremia    Pure hyperglyceridemia     Hypertriglyceridemia    Sleep apnea 6/1/2009    Snoring 12/12/2023    Varicella 12/15/1961   [2]   Past Surgical History:  Procedure Laterality Date    CARDIAC CATHETERIZATION N/A 12/02/2024    Procedure: Left Heart Cath;  Surgeon: Jean Avila MD;  Location: [facility] Cardiac Cath Lab;  Service: Cardiovascular;  Laterality: N/A;    CIRCUMCISION, PRIMARY  1956    FRACTURE SURGERY  8/15/1963    Multiple fracture repairs    PROSTATE SURGERY  08/2022    for BPH    VASECTOMY  7/1/1986   [3]   Family History  Problem Relation Name Age of Onset    Other (passed of heart attack) Father Prashanth     Hypertension Father Prashanth     Heart attack Father Prashanth     Cancer Sister Sade     Miscarriages / Stillbirths Mother Saritha     Hypertension Brother Jeffrey     Intellectual Disability Brother Jonathan     Stroke Maternal Grandfather Jeffrey Butler    [4] No Known Allergies

## 2025-08-09 LAB
ATRIAL RATE: 54 BPM
P AXIS: 72 DEGREES
P OFFSET: 203 MS
P ONSET: 139 MS
PR INTERVAL: 152 MS
Q ONSET: 215 MS
QRS COUNT: 9 BEATS
QRS DURATION: 98 MS
QT INTERVAL: 420 MS
QTC CALCULATION(BAZETT): 398 MS
QTC FREDERICIA: 405 MS
R AXIS: 59 DEGREES
T AXIS: 93 DEGREES
T OFFSET: 425 MS
VENTRICULAR RATE: 54 BPM

## 2025-08-15 ENCOUNTER — HOSPITAL ENCOUNTER (OUTPATIENT)
Facility: HOSPITAL | Age: 70
Setting detail: OUTPATIENT SURGERY
Discharge: HOME | End: 2025-08-15
Attending: STUDENT IN AN ORGANIZED HEALTH CARE EDUCATION/TRAINING PROGRAM | Admitting: STUDENT IN AN ORGANIZED HEALTH CARE EDUCATION/TRAINING PROGRAM
Payer: MEDICARE

## 2025-08-15 ENCOUNTER — ANESTHESIA EVENT (OUTPATIENT)
Dept: CARDIOLOGY | Facility: HOSPITAL | Age: 70
End: 2025-08-15
Payer: MEDICARE

## 2025-08-15 ENCOUNTER — ANESTHESIA (OUTPATIENT)
Dept: CARDIOLOGY | Facility: HOSPITAL | Age: 70
End: 2025-08-15
Payer: MEDICARE

## 2025-08-15 ENCOUNTER — HOSPITAL ENCOUNTER (OUTPATIENT)
Dept: CARDIOLOGY | Facility: HOSPITAL | Age: 70
Setting detail: OUTPATIENT SURGERY
Discharge: HOME | End: 2025-08-15
Payer: MEDICARE

## 2025-08-15 VITALS
TEMPERATURE: 97.3 F | WEIGHT: 225 LBS | DIASTOLIC BLOOD PRESSURE: 66 MMHG | HEART RATE: 65 BPM | OXYGEN SATURATION: 96 % | RESPIRATION RATE: 18 BRPM | HEIGHT: 70 IN | BODY MASS INDEX: 32.21 KG/M2 | SYSTOLIC BLOOD PRESSURE: 157 MMHG

## 2025-08-15 DIAGNOSIS — I48.0 PAROXYSMAL ATRIAL FIBRILLATION (MULTI): Primary | ICD-10-CM

## 2025-08-15 PROBLEM — M15.9 OSTEOARTHRITIS OF MULTIPLE JOINTS: Status: ACTIVE | Noted: 2025-08-15

## 2025-08-15 LAB
ABO GROUP (TYPE) IN BLOOD: NORMAL
ACT BLD: 330 SEC (ref 89–169)
ACT BLD: 330 SEC (ref 89–169)
ACT BLD: 350 SEC (ref 89–169)
ACT BLD: 385 SEC (ref 89–169)
RH FACTOR (ANTIGEN D): NORMAL

## 2025-08-15 PROCEDURE — 93005 ELECTROCARDIOGRAM TRACING: CPT | Mod: 59

## 2025-08-15 PROCEDURE — C1766 INTRO/SHEATH,STRBLE,NON-PEEL: HCPCS | Performed by: STUDENT IN AN ORGANIZED HEALTH CARE EDUCATION/TRAINING PROGRAM

## 2025-08-15 PROCEDURE — 85347 COAGULATION TIME ACTIVATED: CPT

## 2025-08-15 PROCEDURE — 7100000010 HC PHASE TWO TIME - EACH INCREMENTAL 1 MINUTE: Performed by: STUDENT IN AN ORGANIZED HEALTH CARE EDUCATION/TRAINING PROGRAM

## 2025-08-15 PROCEDURE — 93657 TX L/R ATRIAL FIB ADDL: CPT | Performed by: STUDENT IN AN ORGANIZED HEALTH CARE EDUCATION/TRAINING PROGRAM

## 2025-08-15 PROCEDURE — 76937 US GUIDE VASCULAR ACCESS: CPT | Performed by: STUDENT IN AN ORGANIZED HEALTH CARE EDUCATION/TRAINING PROGRAM

## 2025-08-15 PROCEDURE — 3700000001 HC GENERAL ANESTHESIA TIME - INITIAL BASE CHARGE: Performed by: STUDENT IN AN ORGANIZED HEALTH CARE EDUCATION/TRAINING PROGRAM

## 2025-08-15 PROCEDURE — 2500000004 HC RX 250 GENERAL PHARMACY W/ HCPCS (ALT 636 FOR OP/ED): Performed by: NURSE ANESTHETIST, CERTIFIED REGISTERED

## 2025-08-15 PROCEDURE — 2500000004 HC RX 250 GENERAL PHARMACY W/ HCPCS (ALT 636 FOR OP/ED): Performed by: STUDENT IN AN ORGANIZED HEALTH CARE EDUCATION/TRAINING PROGRAM

## 2025-08-15 PROCEDURE — C1759 CATH, INTRA ECHOCARDIOGRAPHY: HCPCS | Performed by: STUDENT IN AN ORGANIZED HEALTH CARE EDUCATION/TRAINING PROGRAM

## 2025-08-15 PROCEDURE — 93656 COMPRE EP EVAL ABLTJ ATR FIB: CPT | Performed by: STUDENT IN AN ORGANIZED HEALTH CARE EDUCATION/TRAINING PROGRAM

## 2025-08-15 PROCEDURE — 2500000005 HC RX 250 GENERAL PHARMACY W/O HCPCS: Performed by: NURSE ANESTHETIST, CERTIFIED REGISTERED

## 2025-08-15 PROCEDURE — C1887 CATHETER, GUIDING: HCPCS | Performed by: STUDENT IN AN ORGANIZED HEALTH CARE EDUCATION/TRAINING PROGRAM

## 2025-08-15 PROCEDURE — 7100000009 HC PHASE TWO TIME - INITIAL BASE CHARGE: Performed by: STUDENT IN AN ORGANIZED HEALTH CARE EDUCATION/TRAINING PROGRAM

## 2025-08-15 PROCEDURE — C1769 GUIDE WIRE: HCPCS | Performed by: STUDENT IN AN ORGANIZED HEALTH CARE EDUCATION/TRAINING PROGRAM

## 2025-08-15 PROCEDURE — 36415 COLL VENOUS BLD VENIPUNCTURE: CPT | Performed by: NURSE PRACTITIONER

## 2025-08-15 PROCEDURE — C1733 CATH, EP, OTHR THAN COOL-TIP: HCPCS | Performed by: STUDENT IN AN ORGANIZED HEALTH CARE EDUCATION/TRAINING PROGRAM

## 2025-08-15 PROCEDURE — 7100000001 HC RECOVERY ROOM TIME - INITIAL BASE CHARGE: Performed by: STUDENT IN AN ORGANIZED HEALTH CARE EDUCATION/TRAINING PROGRAM

## 2025-08-15 PROCEDURE — C1730 CATH, EP, 19 OR FEW ELECT: HCPCS | Performed by: STUDENT IN AN ORGANIZED HEALTH CARE EDUCATION/TRAINING PROGRAM

## 2025-08-15 PROCEDURE — C1760 CLOSURE DEV, VASC: HCPCS | Performed by: STUDENT IN AN ORGANIZED HEALTH CARE EDUCATION/TRAINING PROGRAM

## 2025-08-15 PROCEDURE — 3700000002 HC GENERAL ANESTHESIA TIME - EACH INCREMENTAL 1 MINUTE: Performed by: STUDENT IN AN ORGANIZED HEALTH CARE EDUCATION/TRAINING PROGRAM

## 2025-08-15 PROCEDURE — 7100000002 HC RECOVERY ROOM TIME - EACH INCREMENTAL 1 MINUTE: Performed by: STUDENT IN AN ORGANIZED HEALTH CARE EDUCATION/TRAINING PROGRAM

## 2025-08-15 PROCEDURE — 2720000007 HC OR 272 NO HCPCS: Performed by: STUDENT IN AN ORGANIZED HEALTH CARE EDUCATION/TRAINING PROGRAM

## 2025-08-15 PROCEDURE — 2780000003 HC OR 278 NO HCPCS: Performed by: STUDENT IN AN ORGANIZED HEALTH CARE EDUCATION/TRAINING PROGRAM

## 2025-08-15 RX ORDER — HEPARIN SODIUM 10000 [USP'U]/100ML
INJECTION, SOLUTION INTRAVENOUS CONTINUOUS PRN
Status: DISCONTINUED | OUTPATIENT
Start: 2025-08-15 | End: 2025-08-15

## 2025-08-15 RX ORDER — ONDANSETRON HYDROCHLORIDE 2 MG/ML
4 INJECTION, SOLUTION INTRAVENOUS EVERY 8 HOURS PRN
Status: DISCONTINUED | OUTPATIENT
Start: 2025-08-15 | End: 2025-08-15 | Stop reason: HOSPADM

## 2025-08-15 RX ORDER — SODIUM CHLORIDE, SODIUM LACTATE, POTASSIUM CHLORIDE, CALCIUM CHLORIDE 600; 310; 30; 20 MG/100ML; MG/100ML; MG/100ML; MG/100ML
100 INJECTION, SOLUTION INTRAVENOUS CONTINUOUS
Status: DISCONTINUED | OUTPATIENT
Start: 2025-08-15 | End: 2025-08-15 | Stop reason: HOSPADM

## 2025-08-15 RX ORDER — PROPOFOL 10 MG/ML
INJECTION, EMULSION INTRAVENOUS AS NEEDED
Status: DISCONTINUED | OUTPATIENT
Start: 2025-08-15 | End: 2025-08-15

## 2025-08-15 RX ORDER — ONDANSETRON 4 MG/1
4 TABLET, FILM COATED ORAL EVERY 8 HOURS PRN
Status: DISCONTINUED | OUTPATIENT
Start: 2025-08-15 | End: 2025-08-15 | Stop reason: HOSPADM

## 2025-08-15 RX ORDER — LIDOCAINE HYDROCHLORIDE 10 MG/ML
INJECTION, SOLUTION INFILTRATION; PERINEURAL AS NEEDED
Status: DISCONTINUED | OUTPATIENT
Start: 2025-08-15 | End: 2025-08-15

## 2025-08-15 RX ORDER — SODIUM CHLORIDE, SODIUM LACTATE, POTASSIUM CHLORIDE, CALCIUM CHLORIDE 600; 310; 30; 20 MG/100ML; MG/100ML; MG/100ML; MG/100ML
INJECTION, SOLUTION INTRAVENOUS CONTINUOUS PRN
Status: DISCONTINUED | OUTPATIENT
Start: 2025-08-15 | End: 2025-08-15

## 2025-08-15 RX ORDER — ROCURONIUM BROMIDE 10 MG/ML
INJECTION, SOLUTION INTRAVENOUS AS NEEDED
Status: DISCONTINUED | OUTPATIENT
Start: 2025-08-15 | End: 2025-08-15

## 2025-08-15 RX ORDER — ONDANSETRON HYDROCHLORIDE 2 MG/ML
INJECTION, SOLUTION INTRAVENOUS AS NEEDED
Status: DISCONTINUED | OUTPATIENT
Start: 2025-08-15 | End: 2025-08-15

## 2025-08-15 RX ORDER — PHENYLEPHRINE HCL IN 0.9% NACL 1 MG/10 ML
SYRINGE (ML) INTRAVENOUS AS NEEDED
Status: DISCONTINUED | OUTPATIENT
Start: 2025-08-15 | End: 2025-08-15

## 2025-08-15 RX ORDER — PROTAMINE SULFATE 10 MG/ML
INJECTION, SOLUTION INTRAVENOUS AS NEEDED
Status: DISCONTINUED | OUTPATIENT
Start: 2025-08-15 | End: 2025-08-15

## 2025-08-15 RX ORDER — HYDRALAZINE HYDROCHLORIDE 20 MG/ML
5 INJECTION INTRAMUSCULAR; INTRAVENOUS EVERY 30 MIN PRN
Status: DISCONTINUED | OUTPATIENT
Start: 2025-08-15 | End: 2025-08-15 | Stop reason: HOSPADM

## 2025-08-15 RX ORDER — GLYCOPYRROLATE 0.2 MG/ML
INJECTION INTRAMUSCULAR; INTRAVENOUS AS NEEDED
Status: DISCONTINUED | OUTPATIENT
Start: 2025-08-15 | End: 2025-08-15

## 2025-08-15 RX ORDER — LIDOCAINE HYDROCHLORIDE 10 MG/ML
0.1 INJECTION, SOLUTION INFILTRATION; PERINEURAL ONCE
Status: DISCONTINUED | OUTPATIENT
Start: 2025-08-15 | End: 2025-08-15 | Stop reason: HOSPADM

## 2025-08-15 RX ORDER — ACETAMINOPHEN 325 MG/1
650 TABLET ORAL EVERY 4 HOURS PRN
Status: DISCONTINUED | OUTPATIENT
Start: 2025-08-15 | End: 2025-08-15 | Stop reason: HOSPADM

## 2025-08-15 RX ORDER — ONDANSETRON HYDROCHLORIDE 2 MG/ML
4 INJECTION, SOLUTION INTRAVENOUS ONCE AS NEEDED
Status: DISCONTINUED | OUTPATIENT
Start: 2025-08-15 | End: 2025-08-15 | Stop reason: HOSPADM

## 2025-08-15 RX ORDER — ALBUTEROL SULFATE 0.83 MG/ML
2.5 SOLUTION RESPIRATORY (INHALATION) ONCE AS NEEDED
Status: DISCONTINUED | OUTPATIENT
Start: 2025-08-15 | End: 2025-08-15 | Stop reason: HOSPADM

## 2025-08-15 RX ORDER — HEPARIN SODIUM 1000 [USP'U]/ML
INJECTION, SOLUTION INTRAVENOUS; SUBCUTANEOUS AS NEEDED
Status: DISCONTINUED | OUTPATIENT
Start: 2025-08-15 | End: 2025-08-15

## 2025-08-15 RX ORDER — FENTANYL CITRATE 50 UG/ML
INJECTION, SOLUTION INTRAMUSCULAR; INTRAVENOUS AS NEEDED
Status: DISCONTINUED | OUTPATIENT
Start: 2025-08-15 | End: 2025-08-15

## 2025-08-15 RX ORDER — BUPIVACAINE HYDROCHLORIDE 2.5 MG/ML
INJECTION, SOLUTION EPIDURAL; INFILTRATION; INTRACAUDAL; PERINEURAL AS NEEDED
Status: DISCONTINUED | OUTPATIENT
Start: 2025-08-15 | End: 2025-08-15 | Stop reason: HOSPADM

## 2025-08-15 RX ORDER — HEPARIN SODIUM 10000 [USP'U]/100ML
INJECTION, SOLUTION INTRAVENOUS AS NEEDED
Status: DISCONTINUED | OUTPATIENT
Start: 2025-08-15 | End: 2025-08-15

## 2025-08-15 RX ADMIN — EPHEDRINE SULFATE 5 MG: 50 INJECTION, SOLUTION INTRAVENOUS at 11:59

## 2025-08-15 RX ADMIN — HEPARIN SODIUM 10000 UNITS: 1000 INJECTION INTRAVENOUS; SUBCUTANEOUS at 12:17

## 2025-08-15 RX ADMIN — DEXAMETHASONE SODIUM PHOSPHATE 8 MG: 4 INJECTION, SOLUTION INTRAMUSCULAR; INTRAVENOUS at 11:51

## 2025-08-15 RX ADMIN — HEPARIN SODIUM 9.79 UNITS/KG/HR: 10000 INJECTION, SOLUTION INTRAVENOUS at 12:17

## 2025-08-15 RX ADMIN — Medication 100 MCG: at 12:27

## 2025-08-15 RX ADMIN — ROCURONIUM BROMIDE 10 MG: 10 INJECTION, SOLUTION INTRAVENOUS at 12:31

## 2025-08-15 RX ADMIN — GLYCOPYRROLATE 0.04 MG: 0.2 INJECTION INTRAMUSCULAR; INTRAVENOUS at 11:58

## 2025-08-15 RX ADMIN — ROCURONIUM BROMIDE 100 MG: 10 INJECTION, SOLUTION INTRAVENOUS at 11:31

## 2025-08-15 RX ADMIN — PROTAMINE SULFATE 20 MG: 10 INJECTION, SOLUTION INTRAVENOUS at 13:28

## 2025-08-15 RX ADMIN — LIDOCAINE HYDROCHLORIDE 5 ML: 10 INJECTION, SOLUTION INFILTRATION; PERINEURAL at 11:31

## 2025-08-15 RX ADMIN — PROPOFOL 200 MG: 10 INJECTION, EMULSION INTRAVENOUS at 11:31

## 2025-08-15 RX ADMIN — SODIUM CHLORIDE, POTASSIUM CHLORIDE, SODIUM LACTATE AND CALCIUM CHLORIDE: 600; 310; 30; 20 INJECTION, SOLUTION INTRAVENOUS at 11:30

## 2025-08-15 RX ADMIN — FENTANYL CITRATE 25 MCG: 50 INJECTION, SOLUTION INTRAMUSCULAR; INTRAVENOUS at 11:38

## 2025-08-15 RX ADMIN — SUGAMMADEX 200 MG: 100 INJECTION, SOLUTION INTRAVENOUS at 13:31

## 2025-08-15 RX ADMIN — Medication 100 MCG: at 12:14

## 2025-08-15 RX ADMIN — FENTANYL CITRATE 50 MCG: 50 INJECTION, SOLUTION INTRAMUSCULAR; INTRAVENOUS at 11:31

## 2025-08-15 RX ADMIN — FENTANYL CITRATE 25 MCG: 50 INJECTION, SOLUTION INTRAMUSCULAR; INTRAVENOUS at 11:51

## 2025-08-15 RX ADMIN — ONDANSETRON HYDROCHLORIDE 4 MG: 2 INJECTION INTRAMUSCULAR; INTRAVENOUS at 13:29

## 2025-08-15 RX ADMIN — ROCURONIUM BROMIDE 10 MG: 10 INJECTION, SOLUTION INTRAVENOUS at 12:41

## 2025-08-15 SDOH — HEALTH STABILITY: MENTAL HEALTH: CURRENT SMOKER: 0

## 2025-08-15 ASSESSMENT — PAIN - FUNCTIONAL ASSESSMENT
PAIN_FUNCTIONAL_ASSESSMENT: 0-10

## 2025-08-15 ASSESSMENT — PAIN SCALES - GENERAL
PAINLEVEL_OUTOF10: 0 - NO PAIN

## 2025-08-18 LAB
ATRIAL RATE: 60 BPM
P AXIS: 61 DEGREES
P OFFSET: 196 MS
P ONSET: 139 MS
PR INTERVAL: 160 MS
Q ONSET: 219 MS
QRS COUNT: 10 BEATS
QRS DURATION: 88 MS
QT INTERVAL: 422 MS
QTC CALCULATION(BAZETT): 422 MS
QTC FREDERICIA: 422 MS
R AXIS: 38 DEGREES
T AXIS: 103 DEGREES
T OFFSET: 430 MS
VENTRICULAR RATE: 60 BPM

## 2025-08-18 ASSESSMENT — PAIN SCALES - GENERAL: PAINLEVEL_OUTOF10: 0 - NO PAIN

## 2025-08-21 ENCOUNTER — APPOINTMENT (OUTPATIENT)
Dept: GENETICS | Facility: CLINIC | Age: 70
End: 2025-08-21
Payer: MEDICARE

## 2025-09-02 ENCOUNTER — OFFICE VISIT (OUTPATIENT)
Dept: SLEEP MEDICINE | Facility: CLINIC | Age: 70
End: 2025-09-02
Payer: MEDICARE

## 2025-09-02 VITALS
WEIGHT: 231.6 LBS | OXYGEN SATURATION: 96 % | SYSTOLIC BLOOD PRESSURE: 126 MMHG | DIASTOLIC BLOOD PRESSURE: 81 MMHG | HEIGHT: 70 IN | HEART RATE: 53 BPM | RESPIRATION RATE: 16 BRPM | BODY MASS INDEX: 33.16 KG/M2 | TEMPERATURE: 98.2 F

## 2025-09-02 DIAGNOSIS — G47.33 OBSTRUCTIVE SLEEP APNEA SYNDROME: Primary | ICD-10-CM

## 2025-09-02 DIAGNOSIS — E66.811 OBESITY, CLASS I, BMI 30-34.9: ICD-10-CM

## 2025-09-02 DIAGNOSIS — G47.33 OSA (OBSTRUCTIVE SLEEP APNEA): ICD-10-CM

## 2025-09-02 DIAGNOSIS — I48.0 PAROXYSMAL ATRIAL FIBRILLATION (MULTI): ICD-10-CM

## 2025-09-02 PROCEDURE — 1036F TOBACCO NON-USER: CPT | Performed by: PHYSICIAN ASSISTANT

## 2025-09-02 PROCEDURE — 3074F SYST BP LT 130 MM HG: CPT | Performed by: PHYSICIAN ASSISTANT

## 2025-09-02 PROCEDURE — 1159F MED LIST DOCD IN RCRD: CPT | Performed by: PHYSICIAN ASSISTANT

## 2025-09-02 PROCEDURE — 3079F DIAST BP 80-89 MM HG: CPT | Performed by: PHYSICIAN ASSISTANT

## 2025-09-02 PROCEDURE — 1126F AMNT PAIN NOTED NONE PRSNT: CPT | Performed by: PHYSICIAN ASSISTANT

## 2025-09-02 PROCEDURE — 99214 OFFICE O/P EST MOD 30 MIN: CPT | Performed by: PHYSICIAN ASSISTANT

## 2025-09-02 PROCEDURE — 3008F BODY MASS INDEX DOCD: CPT | Performed by: PHYSICIAN ASSISTANT

## 2025-09-02 ASSESSMENT — LIFESTYLE VARIABLES
HOW OFTEN DO YOU HAVE A DRINK CONTAINING ALCOHOL: MONTHLY OR LESS
AUDIT-C TOTAL SCORE: 1
HOW OFTEN DO YOU HAVE SIX OR MORE DRINKS ON ONE OCCASION: NEVER
HOW MANY STANDARD DRINKS CONTAINING ALCOHOL DO YOU HAVE ON A TYPICAL DAY: 1 OR 2
SKIP TO QUESTIONS 9-10: 1

## 2025-09-02 ASSESSMENT — PAIN SCALES - GENERAL: PAINLEVEL_OUTOF10: 0-NO PAIN

## 2025-09-02 ASSESSMENT — ENCOUNTER SYMPTOMS
DEPRESSION: 0
LOSS OF SENSATION IN FEET: 0
OCCASIONAL FEELINGS OF UNSTEADINESS: 0

## 2025-09-03 ENCOUNTER — APPOINTMENT (OUTPATIENT)
Dept: PRIMARY CARE | Facility: CLINIC | Age: 70
End: 2025-09-03
Payer: MEDICARE

## 2025-09-03 ASSESSMENT — PAIN SCALES - GENERAL: PAINLEVEL_OUTOF10: 0-NO PAIN

## 2025-09-03 ASSESSMENT — ENCOUNTER SYMPTOMS
MUSCULOSKELETAL NEGATIVE: 1
PSYCHIATRIC NEGATIVE: 1
CARDIOVASCULAR NEGATIVE: 1
RESPIRATORY NEGATIVE: 1
GASTROINTESTINAL NEGATIVE: 1
NEUROLOGICAL NEGATIVE: 1
CONSTITUTIONAL NEGATIVE: 1

## 2025-09-04 ENCOUNTER — DOCUMENTATION (OUTPATIENT)
Dept: GENETICS | Facility: CLINIC | Age: 70
End: 2025-09-04
Payer: MEDICARE

## 2025-09-05 PROBLEM — C61 MALIGNANT NEOPLASM OF PROSTATE (MULTI): Status: ACTIVE | Noted: 2025-09-05

## 2025-09-17 ENCOUNTER — APPOINTMENT (OUTPATIENT)
Dept: CARDIOLOGY | Facility: CLINIC | Age: 70
End: 2025-09-17
Payer: MEDICARE

## 2026-01-06 ENCOUNTER — APPOINTMENT (OUTPATIENT)
Dept: PRIMARY CARE | Facility: CLINIC | Age: 71
End: 2026-01-06
Payer: MEDICARE

## 2026-03-02 ENCOUNTER — APPOINTMENT (OUTPATIENT)
Dept: UROLOGY | Facility: CLINIC | Age: 71
End: 2026-03-02
Payer: MEDICARE

## (undated) DEVICE — CATHETER, ELCTROPHYSIOLOGY, DIAGNOSTIC, SUPREME, 4 ELECTRODE, 2-5-2 MM SPACING, JOSEPHSON CURVE, 6 FR X 120 CM

## (undated) DEVICE — INTRODUCER SHEATH, GLIDESHEATH, 6FR 10CM

## (undated) DEVICE — PACK, ANGIO P2, CUSTOM, LAKE

## (undated) DEVICE — GUIDEWIRE, AMPLATZ, TFE, EXTRA STIFF, CURVED, .032/145CM/3MMTIP

## (undated) DEVICE — INTRODUCER, SHEATH, FAST-CATH, 6 FR X 23 CM

## (undated) DEVICE — INTRODUCER, SHEATH, AVANTI, 10 FR X 11 CM

## (undated) DEVICE — ANGIO KIT, LEFT HEART, LF, CUSTOM

## (undated) DEVICE — GUIDEWIRE, INQWIRE, .035 X 180CM, 1.5 J-TIP

## (undated) DEVICE — SHEATH, STEERABLE, FARADRIVE, CLEAR

## (undated) DEVICE — ELECTRODE KIT, ENSITE X EP SYSTEM SURFACE

## (undated) DEVICE — TR BAND, RADIAL COMPRESSION, LONG, 29CM

## (undated) DEVICE — CLOSURE SYSTEM, VASCULAR, MVP 6-12FR, VENOUS

## (undated) DEVICE — PAD, ELECTRODE DEFIB PADPRO ADULT STRL W/ADAPTER

## (undated) DEVICE — CATHETER, VIEW FLEX XTRA ICE, 9FR

## (undated) DEVICE — Device

## (undated) DEVICE — TR BAND, RADIAL COMPRESSION, STANDARD, 24CM

## (undated) DEVICE — CABLE, BLACK, QUADRIPOLAR

## (undated) DEVICE — NEEDLE, ENTRY, PERCUTANEOUS, 21 G X 2.5 CM

## (undated) DEVICE — INTRODUCER SET, ULTIMUM IV, 12FR X 12CM

## (undated) DEVICE — CABLE CONNECTION, CATHETER, PFA RX-HRD

## (undated) DEVICE — INTRODUCER, VASCULAR, HEMOSTASIS, 14 FR, 12 CM

## (undated) DEVICE — COVER, EQUIPMENT, ZERO GRAVITY

## (undated) DEVICE — CATHETER, ABLATION, PULSED FIELD, FARAWAVE 31 MM

## (undated) DEVICE — CATHETER, OPTITORQUE, 5FR, TIG1, 1H/100CM

## (undated) DEVICE — CLOSURE SYSTEM, VASCADE MVP XL, 10-12FR